# Patient Record
Sex: MALE | Race: WHITE | NOT HISPANIC OR LATINO | Employment: FULL TIME | URBAN - METROPOLITAN AREA
[De-identification: names, ages, dates, MRNs, and addresses within clinical notes are randomized per-mention and may not be internally consistent; named-entity substitution may affect disease eponyms.]

---

## 2017-01-16 ENCOUNTER — OFFICE VISIT (OUTPATIENT)
Dept: CARDIOLOGY | Facility: MEDICAL CENTER | Age: 52
End: 2017-01-16
Payer: COMMERCIAL

## 2017-01-16 ENCOUNTER — TELEPHONE (OUTPATIENT)
Dept: CARDIOLOGY | Facility: MEDICAL CENTER | Age: 52
End: 2017-01-16

## 2017-01-16 VITALS
SYSTOLIC BLOOD PRESSURE: 118 MMHG | BODY MASS INDEX: 35.47 KG/M2 | WEIGHT: 226 LBS | HEART RATE: 92 BPM | OXYGEN SATURATION: 93 % | DIASTOLIC BLOOD PRESSURE: 82 MMHG | HEIGHT: 67 IN

## 2017-01-16 DIAGNOSIS — E78.5 DYSLIPIDEMIA: ICD-10-CM

## 2017-01-16 DIAGNOSIS — R53.83 FATIGUE, UNSPECIFIED TYPE: ICD-10-CM

## 2017-01-16 DIAGNOSIS — R06.09 DOE (DYSPNEA ON EXERTION): ICD-10-CM

## 2017-01-16 DIAGNOSIS — R07.2 PRECORDIAL PAIN: ICD-10-CM

## 2017-01-16 PROCEDURE — 99244 OFF/OP CNSLTJ NEW/EST MOD 40: CPT | Performed by: INTERNAL MEDICINE

## 2017-01-16 RX ORDER — POTASSIUM CHLORIDE 20 MEQ/1
10 TABLET, EXTENDED RELEASE ORAL DAILY
Qty: 30 TAB | Refills: 11 | Status: SHIPPED | OUTPATIENT
Start: 2017-01-16 | End: 2017-12-05

## 2017-01-16 RX ORDER — FUROSEMIDE 20 MG/1
20 TABLET ORAL DAILY
Qty: 30 TAB | Refills: 11 | Status: SHIPPED | OUTPATIENT
Start: 2017-01-16 | End: 2017-12-05

## 2017-01-16 RX ORDER — FAMOTIDINE 40 MG/1
40 TABLET, FILM COATED ORAL DAILY
COMMUNITY
Start: 2017-01-09 | End: 2023-11-27

## 2017-01-16 ASSESSMENT — ENCOUNTER SYMPTOMS
FEVER: 0
ABDOMINAL PAIN: 0
HEADACHES: 0
DIZZINESS: 1
NERVOUS/ANXIOUS: 0
MYALGIAS: 0
DEPRESSION: 0
CHILLS: 0
DOUBLE VISION: 0
PALPITATIONS: 0
PND: 0
BLOOD IN STOOL: 0
LOSS OF CONSCIOUSNESS: 0
CLAUDICATION: 0
SHORTNESS OF BREATH: 1
BLURRED VISION: 0
COUGH: 0
ORTHOPNEA: 1

## 2017-01-16 NOTE — MR AVS SNAPSHOT
"        Sachin Mandujano   2017 2:00 PM   Office Visit   MRN: [id number]    Department:  [facility]   Dept Phone:  [phone]    Description:  Male : 1965   Provider:  Jaymie Aguiar M.D.           Allergies as of 2017     No Known Allergies      You were diagnosed with     RITCHIE (dyspnea on exertion)   [942327]       Fatigue, unspecified type   [7348402]       Precordial pain   [786.51.ICD-9-CM]       Dyslipidemia   [975830]         Vital Signs     Blood Pressure Pulse Height Weight Body Mass Index Oxygen Saturation    118/82 mmHg 92 1.702 m (5' 7.01\") 102.513 kg (226 lb) 35.39 kg/m2 93%    Smoking Status                   Never Smoker            Basic Information     Date Of Birth Sex Race Ethnicity Preferred Language    1965 Male White Non- English      Your appointments     2017  2:00 PM   NEW PATIENT with Jaymie Aguiar M.D.   [facility] (--)    [address]  [facility] [address] [phone]              Problem List              ICD-10-CM Priority Class Noted - Resolved    Umbilical hernia K42.9   2013 - Present      Health Maintenance     Patient has no pending health maintenance at this time      Current Immunizations     No immunizations on file.      Below and/or attached are the medications your provider expects you to take. Review all of your home medications and newly ordered medications with your provider and/or pharmacist. Follow medication instructions as directed by your provider and/or pharmacist. Please keep your medication list with you and share with your provider. Update the information when medications are discontinued, doses are changed, or new medications (including over-the-counter products) are added; and carry medication information at all times in the event of emergency situations     Allergies:  No Known Allergies          Medications  Valid as of: 2017 -  " 1:42 PM    Generic Name Brand Name Tablet Size Instructions for use    Allopurinol (Tab) ZYLOPRIM 300 MG Take 300 mg by mouth every day.          Famotidine (Tab) PEPCID 40 MG         Furosemide (Tab) LASIX 20 MG Take 1 Tab by mouth every day.        Oxycodone-Acetaminophen (Tab) PERCOCET 5-325 MG Take 1-2 Tabs by mouth every four hours as needed.        Oxycodone-Acetaminophen (Tab) PERCOCET 5-325 MG Take 1-2 Tabs by mouth every four hours as needed ((Pain scale 4 - 6)).        Potassium Chloride Shelby CR (Tab CR) K-DUR 20 MEQ Take 0.5 Tabs by mouth every day.        .                 Medicines prescribed today were sent to:     [id number] - [place], [place] - [address]    [address] [place] [address]    Phone: [phone] Fax: [phone]    Open 24 Hours?: No      Medication refill instructions:       If your prescription bottle indicates you have medication refills left, it is not necessary to call your provider’s office. Please contact your pharmacy and they will refill your medication.    If your prescription bottle indicates you do not have any refills left, you may request refills at any time through one of the following ways: The online [employer] system (except Urgent Care), by calling your provider’s office, or by asking your pharmacy to contact your provider’s office with a refill request. Medication refills are processed only during regular business hours and may not be available until the next business day. Your provider may request additional information or to have a follow-up visit with you prior to refilling your medication.   *Please Note: Medication refills are assigned a new Rx number when refilled electronically. Your pharmacy may indicate that no refills were authorized even though a new prescription for the same medication is available at the pharmacy. Please request the medicine by name with the pharmacy before contacting your provider for a refill.        Your To Do List      Future Labs/Procedures Complete By Expires    CT-CARDIAC SCORING  As directed 1/16/2018    Echocardiogram Comp w/o Cont  As directed 1/16/2018         MyChart Access Code: Activation code not generated  Current Idea Shower Status: Active

## 2017-01-16 NOTE — Clinical Note
[facility]   [address]  [place] [address]  Phone: [phone]  Fax: [phone]              Sachin Mandujano  1965    Encounter Date: 1/16/2017    Jaymie Aguiar M.D.          PROGRESS NOTE:  Subjective:   Sachin Mandujano is a 51 y.o. male no significant past medical history presenting today for evaluation of dyspnea, dizziness and chest pain    The past few months patient has noticed intermittent episodes of chest tightness worse with activity relieved with rest. Denied any associated complaints of nausea. Intermittent episodes of diaphoresis and dizziness associated with exertion. Also complaining of gait daytime fatigue and tiredness. Intermittent episodes of orthopnea but no PND. Denied any complaints of lower extremity edema or claudication.    Past Medical History   Diagnosis Date   • Arthritis    • Dental disorder      dentures   • Snoring      no sleep study     Past Surgical History   Procedure Laterality Date   • Laparoscopic umbilical hernia repair  1/24/2013     Performed by Junior Dodd M.D. at [facility]S   • Hernia repair       Family History   Problem Relation Age of Onset   • Diabetes     • Heart Disease     • Hypertension     • Hypertension Father      History   Smoking status   • Never Smoker    Smokeless tobacco   • Not on file     No Known Allergies  Outpatient Encounter Prescriptions as of 1/16/2017   Medication Sig Dispense Refill   • famotidine (PEPCID) 40 MG Tab      • furosemide (LASIX) 20 MG Tab Take 1 Tab by mouth every day. 30 Tab 11   • potassium chloride SA (K-DUR) 20 MEQ Tab CR Take 0.5 Tabs by mouth every day. 30 Tab 11   • allopurinol (ZYLOPRIM) 300 MG TABS Take 300 mg by mouth every day.       • oxycodone-acetaminophen (PERCOCET) 5-325 MG TABS Take 1-2 Tabs by mouth every four hours as needed. 15 Each 0   • oxycodone-acetaminophen (PERCOCET) 5-325 MG TABS Take 1-2 Tabs by mouth  "every four hours as needed ((Pain scale 4 - 6)). 40 Each 0     No facility-administered encounter medications on file as of 1/16/2017.     Review of Systems   Constitutional: Positive for malaise/fatigue. Negative for fever and chills.   HENT: Positive for tinnitus.    Eyes: Negative for blurred vision and double vision.   Respiratory: Positive for shortness of breath. Negative for cough.    Cardiovascular: Positive for chest pain and orthopnea. Negative for palpitations, claudication, leg swelling and PND.   Gastrointestinal: Negative for abdominal pain and blood in stool.   Genitourinary: Negative for dysuria and hematuria.   Musculoskeletal: Negative for myalgias and joint pain.   Skin: Negative for rash.   Neurological: Positive for dizziness (worse when patient gets up from sitting to standing position but no loss of consciousness). Negative for loss of consciousness and headaches.   Psychiatric/Behavioral: Negative for depression. The patient is not nervous/anxious.         Objective:   /82 mmHg  Pulse 92  Ht 1.702 m (5' 7.01\")  Wt 102.513 kg (226 lb)  BMI 35.39 kg/m2  SpO2 93%    Physical Exam   Constitutional: He is oriented to person, place, and time. He appears well-developed and well-nourished.   HENT:   Head: Normocephalic and atraumatic.   Eyes: Pupils are equal, round, and reactive to light. Right eye exhibits no discharge. Left eye exhibits no discharge.   Neck: JVD (10 cm) present. No tracheal deviation present.   Cardiovascular: Normal rate and regular rhythm.    No murmur heard.  Pulmonary/Chest: Effort normal and breath sounds normal. No respiratory distress. He has no wheezes. He has no rales.   Abdominal: Soft. Bowel sounds are normal. He exhibits no distension. There is no tenderness. There is no rebound.   Musculoskeletal: Normal range of motion. He exhibits no edema.   Neurological: He is alert and oriented to person, place, and time. No cranial nerve deficit.   Skin: Skin is warm " and dry. No erythema.   Psychiatric: He has a normal mood and affect.      Labs: 1/10/17  C-reactive protein cardiac 10.06  Sodium 141 potassium 4.6, chloride 102, bicarbonate 24, glucose 103, BUN 11, creatinine 1.18  Alkaline phosphate 69, AST 19, ALT 20  Total cholesterol 182, triglycerides 77, HDL 41,     Assessment:     1. RITCHIE (dyspnea on exertion)  furosemide (LASIX) 20 MG Tab    potassium chloride SA (K-DUR) 20 MEQ Tab CR    Echocardiogram Comp w/o Cont   2. Fatigue, unspecified type  OVERNIGHT PULSE OXIMETRY   3. Precordial pain  RIH Treadmill Stress   4. Dyslipidemia  CT-CARDIAC SCORING       Medical Decision Making:  Today's Assessment / Status / Plan:     1. Lasix 20 mg along with potassium chloride 10 mEq daily for one week  Echo to assess LV function.  2. Overnight pulse oximetry  3. Regular treadmill stress test.  Echo first to assess LV function if LV function is normal only then will schedule patient for regular treadmill stress test.  4. Cardiac CT scoring  If patient does have coronary pack will start him on statins.    Follow-up in 6 weeks.  Echo, regular treadmill stress test coronary CT prior to next visit.    Thank you for allowing me to participate in taking care of patient.    Jaymie Aguiar. MD.      Joe Rush M.D.  [address] [address]  VIA Facsimile: [phone]

## 2017-01-16 NOTE — PROGRESS NOTES
Subjective:   Sachin Mandujano is a 51 y.o. male no significant past medical history presenting today for evaluation of dyspnea, dizziness and chest pain    The past few months patient has noticed intermittent episodes of chest tightness worse with activity relieved with rest. Denied any associated complaints of nausea. Intermittent episodes of diaphoresis and dizziness associated with exertion. Also complaining of gait daytime fatigue and tiredness. Intermittent episodes of orthopnea but no PND. Denied any complaints of lower extremity edema or claudication.    Past Medical History   Diagnosis Date   • Arthritis    • Dental disorder      dentures   • Snoring      no sleep study     Past Surgical History   Procedure Laterality Date   • Laparoscopic umbilical hernia repair  1/24/2013     Performed by Junior Dodd M.D. at [facility] ORS   • Hernia repair       Family History   Problem Relation Age of Onset   • Diabetes     • Heart Disease     • Hypertension     • Hypertension Father      History   Smoking status   • Never Smoker    Smokeless tobacco   • Not on file     No Known Allergies  Outpatient Encounter Prescriptions as of 1/16/2017   Medication Sig Dispense Refill   • famotidine (PEPCID) 40 MG Tab      • furosemide (LASIX) 20 MG Tab Take 1 Tab by mouth every day. 30 Tab 11   • potassium chloride SA (K-DUR) 20 MEQ Tab CR Take 0.5 Tabs by mouth every day. 30 Tab 11   • allopurinol (ZYLOPRIM) 300 MG TABS Take 300 mg by mouth every day.       • oxycodone-acetaminophen (PERCOCET) 5-325 MG TABS Take 1-2 Tabs by mouth every four hours as needed. 15 Each 0   • oxycodone-acetaminophen (PERCOCET) 5-325 MG TABS Take 1-2 Tabs by mouth every four hours as needed ((Pain scale 4 - 6)). 40 Each 0     No facility-administered encounter medications on file as of 1/16/2017.     Review of Systems   Constitutional: Positive for malaise/fatigue. Negative for fever and chills.   HENT: Positive for tinnitus.    Eyes:  "Negative for blurred vision and double vision.   Respiratory: Positive for shortness of breath. Negative for cough.    Cardiovascular: Positive for chest pain and orthopnea. Negative for palpitations, claudication, leg swelling and PND.   Gastrointestinal: Negative for abdominal pain and blood in stool.   Genitourinary: Negative for dysuria and hematuria.   Musculoskeletal: Negative for myalgias and joint pain.   Skin: Negative for rash.   Neurological: Positive for dizziness (worse when patient gets up from sitting to standing position but no loss of consciousness). Negative for loss of consciousness and headaches.   Psychiatric/Behavioral: Negative for depression. The patient is not nervous/anxious.         Objective:   /82 mmHg  Pulse 92  Ht 1.702 m (5' 7.01\")  Wt 102.513 kg (226 lb)  BMI 35.39 kg/m2  SpO2 93%    Physical Exam   Constitutional: He is oriented to person, place, and time. He appears well-developed and well-nourished.   HENT:   Head: Normocephalic and atraumatic.   Eyes: Pupils are equal, round, and reactive to light. Right eye exhibits no discharge. Left eye exhibits no discharge.   Neck: JVD (10 cm) present. No tracheal deviation present.   Cardiovascular: Normal rate and regular rhythm.    No murmur heard.  Pulmonary/Chest: Effort normal and breath sounds normal. No respiratory distress. He has no wheezes. He has no rales.   Abdominal: Soft. Bowel sounds are normal. He exhibits no distension. There is no tenderness. There is no rebound.   Musculoskeletal: Normal range of motion. He exhibits no edema.   Neurological: He is alert and oriented to person, place, and time. No cranial nerve deficit.   Skin: Skin is warm and dry. No erythema.   Psychiatric: He has a normal mood and affect.      Labs: 1/10/17  C-reactive protein cardiac 10.06  Sodium 141 potassium 4.6, chloride 102, bicarbonate 24, glucose 103, BUN 11, creatinine 1.18  Alkaline phosphate 69, AST 19, ALT 20  Total cholesterol " 182, triglycerides 77, HDL 41,     Assessment:     1. RITCHIE (dyspnea on exertion)  furosemide (LASIX) 20 MG Tab    potassium chloride SA (K-DUR) 20 MEQ Tab CR    Echocardiogram Comp w/o Cont   2. Fatigue, unspecified type  OVERNIGHT PULSE OXIMETRY   3. Precordial pain  RIH Treadmill Stress   4. Dyslipidemia  CT-CARDIAC SCORING       Medical Decision Making:  Today's Assessment / Status / Plan:     1. Lasix 20 mg along with potassium chloride 10 mEq daily for one week  Echo to assess LV function.  2. Overnight pulse oximetry  3. Regular treadmill stress test.  Echo first to assess LV function if LV function is normal only then will schedule patient for regular treadmill stress test.  4. Cardiac CT scoring  If patient does have coronary pack will start him on statins.    Follow-up in 6 weeks.  Echo, regular treadmill stress test coronary CT prior to next visit.    Thank you for allowing me to participate in taking care of patient.    Jaymie Hearn MD.

## 2017-01-17 NOTE — TELEPHONE ENCOUNTER
PAR pending for potassium:  aSchin Mandujano Key: [id number] - PA Case ID: [id number] - Rx #: [id number]  Status   Sent to [employer]   DrugPotassium Chloride Shelby ER 20MEQ er tablets   FormCigna Commercial Electronic PA Form   Original Claim Info76 DAYS SUPPLY IS MORE THAN ALLOWED BY PLAN ; SUBMIT VALUE < OR = 90For help: [id number]

## 2017-01-18 ENCOUNTER — HOSPITAL ENCOUNTER (OUTPATIENT)
Dept: CARDIOLOGY | Facility: MEDICAL CENTER | Age: 52
End: 2017-01-18
Attending: INTERNAL MEDICINE
Payer: COMMERCIAL

## 2017-01-18 DIAGNOSIS — R06.09 DOE (DYSPNEA ON EXERTION): ICD-10-CM

## 2017-01-18 LAB
LV EJECT FRACT MOD 2C 99903: 59.41
LV EJECT FRACT MOD 4C 99902: 61.27
LV EJECT FRACT MOD BP 99901: 60.96

## 2017-01-18 PROCEDURE — 93306 TTE W/DOPPLER COMPLETE: CPT

## 2017-01-18 PROCEDURE — 93306 TTE W/DOPPLER COMPLETE: CPT | Mod: 26 | Performed by: INTERNAL MEDICINE

## 2017-01-19 ENCOUNTER — TELEPHONE (OUTPATIENT)
Dept: CARDIOLOGY | Facility: MEDICAL CENTER | Age: 52
End: 2017-01-19

## 2017-01-19 NOTE — PROGRESS NOTES
TTE: 1/17/17  No prior study is available for comparison.   Left ventricular ejection fraction is visually estimated to be 60%.   Normal regional wall motion. Grade I diastolic dysfunction.  No evidence of valvular abnormality based on Doppler evaluation.   Right ventricular systolic pressure is estimated to be 26 mmHg

## 2017-01-20 ENCOUNTER — TELEPHONE (OUTPATIENT)
Dept: CARDIOLOGY | Facility: MEDICAL CENTER | Age: 52
End: 2017-01-20

## 2017-01-20 NOTE — TELEPHONE ENCOUNTER
DONE    ----- Message from Jaymie Aguiar M.D. sent at 1/18/2017  4:19 PM PST -----  No prior study is available for comparison.   Left ventricular ejection fraction is visually estimated to be 60%.   No evidence of valvular abnormality based on Doppler evaluation.   Right ventricular systolic pressure is estimated to be 26 mmHg.       ----- Message -----     From: Samuel Devine     Sent: 1/18/2017   3:48 PM       To: Jaymie Aguiar M.D.

## 2017-06-21 ENCOUNTER — TELEPHONE (OUTPATIENT)
Dept: CARDIOLOGY | Facility: MEDICAL CENTER | Age: 52
End: 2017-06-21

## 2017-06-21 NOTE — TELEPHONE ENCOUNTER
PAR resubmitted:  Sachin Mandujano Ozuna: [id number]   Outcome   Additional Information Required   Available without authorization.   DrugPotassium Chloride Shelby ER 20MEQ OR TBCR   FormCigna Commercial Electronic PA Form

## 2017-10-23 DIAGNOSIS — Z01.812 PRE-OPERATIVE LABORATORY EXAMINATION: ICD-10-CM

## 2017-10-23 LAB
ANION GAP SERPL CALC-SCNC: 8 MMOL/L (ref 0–11.9)
BUN SERPL-MCNC: 11 MG/DL (ref 8–22)
CALCIUM SERPL-MCNC: 9.4 MG/DL (ref 8.5–10.5)
CHLORIDE SERPL-SCNC: 104 MMOL/L (ref 96–112)
CO2 SERPL-SCNC: 26 MMOL/L (ref 20–33)
CREAT SERPL-MCNC: 0.99 MG/DL (ref 0.5–1.4)
GFR SERPL CREATININE-BSD FRML MDRD: >60 ML/MIN/1.73 M 2
GLUCOSE SERPL-MCNC: 94 MG/DL (ref 65–99)
POTASSIUM SERPL-SCNC: 4 MMOL/L (ref 3.6–5.5)
SODIUM SERPL-SCNC: 138 MMOL/L (ref 135–145)

## 2017-10-23 PROCEDURE — 36415 COLL VENOUS BLD VENIPUNCTURE: CPT

## 2017-10-23 PROCEDURE — 80048 BASIC METABOLIC PNL TOTAL CA: CPT

## 2017-10-23 RX ORDER — LEVOTHYROXINE SODIUM 0.05 MG/1
50 TABLET ORAL
COMMUNITY
End: 2023-11-27 | Stop reason: SDUPTHER

## 2017-10-25 ENCOUNTER — HOSPITAL ENCOUNTER (OUTPATIENT)
Facility: MEDICAL CENTER | Age: 52
End: 2017-10-25
Attending: SURGERY | Admitting: SURGERY
Payer: COMMERCIAL

## 2017-10-25 VITALS
SYSTOLIC BLOOD PRESSURE: 139 MMHG | OXYGEN SATURATION: 93 % | TEMPERATURE: 97.2 F | WEIGHT: 216.71 LBS | RESPIRATION RATE: 16 BRPM | HEIGHT: 67 IN | BODY MASS INDEX: 34.01 KG/M2 | HEART RATE: 83 BPM | DIASTOLIC BLOOD PRESSURE: 84 MMHG

## 2017-10-25 PROBLEM — G56.01 CARPAL TUNNEL SYNDROME ON RIGHT: Status: ACTIVE | Noted: 2017-10-25

## 2017-10-25 PROCEDURE — 500881 HCHG PACK, EXTREMITY: Performed by: SURGERY

## 2017-10-25 PROCEDURE — 160021 HCHG LOCAL: Performed by: SURGERY

## 2017-10-25 PROCEDURE — 160028 HCHG SURGERY MINUTES - 1ST 30 MINS LEVEL 3: Performed by: SURGERY

## 2017-10-25 PROCEDURE — 160025 RECOVERY II MINUTES (STATS): Performed by: SURGERY

## 2017-10-25 PROCEDURE — 160046 HCHG PACU - 1ST 60 MINS PHASE II: Performed by: SURGERY

## 2017-10-25 PROCEDURE — 501838 HCHG SUTURE GENERAL: Performed by: SURGERY

## 2017-10-25 PROCEDURE — 160048 HCHG OR STATISTICAL LEVEL 1-5: Performed by: SURGERY

## 2017-10-25 PROCEDURE — 700111 HCHG RX REV CODE 636 W/ 250 OVERRIDE (IP)

## 2017-10-25 RX ORDER — LIDOCAINE HYDROCHLORIDE AND EPINEPHRINE 10; 10 MG/ML; UG/ML
INJECTION, SOLUTION INFILTRATION; PERINEURAL
Status: DISCONTINUED | OUTPATIENT
Start: 2017-10-25 | End: 2017-10-25 | Stop reason: HOSPADM

## 2017-10-25 RX ORDER — HYDROCODONE BITARTRATE AND ACETAMINOPHEN 5; 325 MG/1; MG/1
1-2 TABLET ORAL EVERY 4 HOURS PRN
Qty: 20 TAB | Refills: 0 | Status: SHIPPED | OUTPATIENT
Start: 2017-10-25 | End: 2017-12-05

## 2017-10-25 RX ORDER — IBUPROFEN 200 MG
600 TABLET ORAL
COMMUNITY

## 2017-10-25 RX ORDER — SODIUM CHLORIDE, SODIUM LACTATE, POTASSIUM CHLORIDE, CALCIUM CHLORIDE 600; 310; 30; 20 MG/100ML; MG/100ML; MG/100ML; MG/100ML
INJECTION, SOLUTION INTRAVENOUS CONTINUOUS
Status: DISCONTINUED | OUTPATIENT
Start: 2017-10-25 | End: 2017-10-25 | Stop reason: HOSPADM

## 2017-10-25 RX ADMIN — SODIUM CHLORIDE, SODIUM LACTATE, POTASSIUM CHLORIDE, CALCIUM CHLORIDE: 600; 310; 30; 20 INJECTION, SOLUTION INTRAVENOUS at 07:36

## 2017-10-25 ASSESSMENT — PAIN SCALES - GENERAL
PAINLEVEL_OUTOF10: 0
PAINLEVEL_OUTOF10: 4

## 2017-10-25 NOTE — DISCHARGE INSTRUCTIONS
ACTIVITY: Rest and take it easy for the first 24 hours.  A responsible adult is recommended to remain with you during that time.  It is normal to feel sleepy.  We encourage you to not do anything that requires balance, judgment or coordination.    MILD FLU-LIKE SYMPTOMS ARE NORMAL. YOU MAY EXPERIENCE GENERALIZED MUSCLE ACHES, THROAT IRRITATION, HEADACHE AND/OR SOME NAUSEA.    FOR 24 HOURS DO NOT:  Drive, operate machinery or run household appliances.  Drink beer or alcoholic beverages.   Make important decisions or sign legal documents.    SPECIAL INSTRUCTIONS:     1) Okay to remove bandage in 4 days and get incision wet and cover with bandaids.     2) Elevate above heart and ice frequently for at least 2 weeks. Encourage sedentary use of hand and frequent moving of fingers to prevent stiffness.     3) No heavy lifting or grasping for at least 4 weeks.     4) No driving while on narcotic pain medications. Contact auto-insurance carrier for clearance to begin driving again.     5) Call MD or come to ER for any major concerns.    DIET: To avoid nausea, slowly advance diet as tolerated, avoiding spicy or greasy foods for the first day.  Add more substantial food to your diet according to your physician's instructions.  Babies can be fed formula or breast milk as soon as they are hungry.  INCREASE FLUIDS AND FIBER TO AVOID CONSTIPATION.    SURGICAL DRESSING/BATHING: *see above*    FOLLOW-UP APPOINTMENT:  A follow-up appointment should be arranged with your doctor; call to schedule.    You should CALL YOUR PHYSICIAN if you develop:  Fever greater than 101 degrees F.  Pain not relieved by medication, or persistent nausea or vomiting.  Excessive bleeding (blood soaking through dressing) or unexpected drainage from the wound.  Extreme redness or swelling around the incision site, drainage of pus or foul smelling drainage.  Inability to urinate or empty your bladder within 8 hours.  Problems with breathing or chest  pain.    You should call 911 if you develop problems with breathing or chest pain.  If you are unable to contact your doctor or surgical center, you should go to the nearest emergency room or urgent care center.  Physician's telephone #: *Dr. Rodriguez [phone]*    If any questions arise, call your doctor.  If your doctor is not available, please feel free to call the Surgical Center at [phone].  The Center is open Monday through Friday from 7AM to 7PM.  You can also call the HEALTH HOTLINE open 24 hours/day, 7 days/week and speak to a nurse at [phone], or toll free at [phone].    A registered nurse may call you a few days after your surgery to see how you are doing after your procedure.    MEDICATIONS: Resume taking daily medication.  Take prescribed pain medication with food.  If no medication is prescribed, you may take non-aspirin pain medication if needed.  PAIN MEDICATION CAN BE VERY CONSTIPATING.  Take a stool softener or laxative such as senokot, pericolace, or milk of magnesia if needed.    Prescription given for *Norco*.      If your physician has prescribed pain medication that includes Acetaminophen (Tylenol), do not take additional Acetaminophen (Tylenol) while taking the prescribed medication.    Depression / Suicide Risk    As you are discharged from this [facility] facility, it is important to learn how to keep safe from harming yourself.    Recognize the warning signs:  · Abrupt changes in personality, positive or negative- including increase in energy   · Giving away possessions  · Change in eating patterns- significant weight changes-  positive or negative  · Change in sleeping patterns- unable to sleep or sleeping all the time   · Unwillingness or inability to communicate  · Depression  · Unusual sadness, discouragement and loneliness  · Talk of wanting to die  · Neglect of personal appearance   · Rebelliousness- reckless behavior  · Withdrawal from people/activities  they love  · Confusion- inability to concentrate     If you or a loved one observes any of these behaviors or has concerns about self-harm, here's what you can do:  · Talk about it- your feelings and reasons for harming yourself  · Remove any means that you might use to hurt yourself (examples: pills, rope, extension cords, firearm)  · Get professional help from the community (Mental Health, Substance Abuse, psychological counseling)  · Do not be alone:Call your Safe Contact- someone whom you trust who will be there for you.  · Call your local CRISIS HOTLINE 279-4513 or 058-749-0729  · Call your local Children's Mobile Crisis Response Team [place] (880) 598-9216 or www.[employer]  · Call the toll free National Suicide Prevention Hotlines   · National Suicide Prevention Lifeline 711-099-WDBH (2669)  · National Hope Line Network 800-SUICIDE (287-9591)

## 2017-10-25 NOTE — OR NURSING
0855 - pt verbalizes readiness for discharge. Instructions reviewed with pt and pt's wife. Pt wanted to walk out; gait steady, wife at side. No further needs.

## 2017-10-25 NOTE — OP REPORT
DATE OF SERVICE:  10/25/2017     SURGEON:  Cirilo Rodriguez MD.     ASSISTANT:  None.     PREOPERATIVE DIAGNOSIS:  Right carpal tunnel syndrome.     POSTOPERATIVE DIAGNOSIS:  Right carpal tunnel syndrome.     PROCEDURE:  Right open carpal tunnel release.     ANESTHESIOLOGIST:  Cesar Wise MD     ANESTHESIA:  Local anesthesia only (wide awake surgery technique using a total   of 20 mL of 1% lidocaine with epinephrine, no tourniquet).     COMPLICATIONS:  None noted.     DRAINS:  None.     SPECIMENS:  None.     ESTIMATED BLOOD LOSS:  Minimal.     TOURNIQUET TIME:  None.     INDICATIONS:  The patient is a 52-year-old gentleman well known to me through   my outpatient clinic for the above-mentioned diagnosis.  He believes and   agrees he has failed conservative treatment and he is a candidate for the   above-mentioned procedure.  Prior to the procedure, he understood the risks,   benefits, and alternatives to surgery.  He understood the risks to include,   but not limited to the risk of infection requiring repeat surgery, bleeding   requiring blood transfusion, nerve, blood vessel, tendon injury requiring   repair, chronic pain, failure to alleviate his symptoms or worsening of   symptoms requiring revision surgery, DVT, pulmonary embolism, heart attack,   stroke, and even death.  Patient states despite these risks, he wished to   proceed with surgery.     DESCRIPTION OF PROCEDURE:  The patient was met in the preoperative holding   area.  His right hand was initialed as correct operative site.  He had an   opportunity to ask questions, all questions were answered and informed consent   was obtained.  Under sterile conditions, I then performed a field block using   20 mL of 1% lidocaine with epinephrine over his carpal tunnel.  He was then   brought to the operating room and laid supine on the operating table.  All   bony and dependent prominences were well padded.  Ancef was administered for   infection  prophylaxis.  The right upper extremity was prepped and draped in   usual sterile fashion.  A formal time-out was performed, in which all parties   agreed upon the correct patient, procedure, and operative site.  I began the   procedure by verifying adequate anesthesia and then made approximately 4 cm   longitudinal incision in line with the ring finger over the carpal tunnel.  I   dissected down through the palmar aponeurosis, identified the transverse   carpal ligament which I released longitudinally in its entirety under direct   visualization as well as the distal forearm antebrachial fascia.  I then   copiously irrigated the wound with normal saline, closed the incision with   interrupted 4-0 nylon suture, covered with sterile Xeroform, 4x4s, and a   well-padded splint.  Patient was transferred in stable condition to PACU where   he had no immediate postoperative complaint.     POSTOPERATIVE PLAN:  The patient will be discharged home per same day   criteria, sedentary use of the upper extremity and follow up in approximately   2 weeks.

## 2017-10-25 NOTE — OR SURGEON
Immediate Post OP Note    PreOp Diagnosis: right CTS    PostOp Diagnosis: same    Procedure(s):  CARPAL TUNNEL RELEASE - Wound Class: Clean    Surgeon(s):  Cirilo Rodriguez M.D.    Anesthesiologist/Type of Anesthesia:  Anesthesiologist: Cesar Wise M.D./Local    Surgical Staff:  Circulator: Matilde Feliciano R.N.  Monitoring Nurse: Judy Beth R.N.  Relief Scrub: Javier Blake  Scrub Person: Pattie Randolph    Specimens:  * No specimens in log *    Estimated Blood Loss: minimal    Findings: see dictation    Complications: none noted.         10/25/2017 8:54 AM Cirilo Rodriguez

## 2017-12-05 ENCOUNTER — APPOINTMENT (OUTPATIENT)
Dept: ADMISSIONS | Facility: MEDICAL CENTER | Age: 52
End: 2017-12-05
Attending: SURGERY
Payer: COMMERCIAL

## 2017-12-13 ENCOUNTER — HOSPITAL ENCOUNTER (OUTPATIENT)
Facility: MEDICAL CENTER | Age: 52
End: 2017-12-13
Attending: SURGERY | Admitting: SURGERY
Payer: COMMERCIAL

## 2017-12-13 VITALS
BODY MASS INDEX: 35.33 KG/M2 | RESPIRATION RATE: 14 BRPM | HEART RATE: 79 BPM | OXYGEN SATURATION: 94 % | WEIGHT: 225.09 LBS | DIASTOLIC BLOOD PRESSURE: 94 MMHG | HEIGHT: 67 IN | SYSTOLIC BLOOD PRESSURE: 135 MMHG | TEMPERATURE: 98.7 F

## 2017-12-13 PROCEDURE — 500881 HCHG PACK, EXTREMITY: Performed by: SURGERY

## 2017-12-13 PROCEDURE — 160025 RECOVERY II MINUTES (STATS): Performed by: SURGERY

## 2017-12-13 PROCEDURE — 700101 HCHG RX REV CODE 250

## 2017-12-13 PROCEDURE — 700111 HCHG RX REV CODE 636 W/ 250 OVERRIDE (IP)

## 2017-12-13 PROCEDURE — 160028 HCHG SURGERY MINUTES - 1ST 30 MINS LEVEL 3: Performed by: SURGERY

## 2017-12-13 PROCEDURE — 160021 HCHG LOCAL: Performed by: SURGERY

## 2017-12-13 PROCEDURE — 160046 HCHG PACU - 1ST 60 MINS PHASE II: Performed by: SURGERY

## 2017-12-13 PROCEDURE — 501838 HCHG SUTURE GENERAL: Performed by: SURGERY

## 2017-12-13 PROCEDURE — 160048 HCHG OR STATISTICAL LEVEL 1-5: Performed by: SURGERY

## 2017-12-13 RX ORDER — LIDOCAINE HYDROCHLORIDE 10 MG/ML
INJECTION, SOLUTION INFILTRATION; PERINEURAL
Status: COMPLETED
Start: 2017-12-13 | End: 2017-12-13

## 2017-12-13 RX ORDER — LIDOCAINE HYDROCHLORIDE 10 MG/ML
0.5 INJECTION, SOLUTION INFILTRATION; PERINEURAL
Status: DISCONTINUED | OUTPATIENT
Start: 2017-12-13 | End: 2017-12-13 | Stop reason: HOSPADM

## 2017-12-13 RX ORDER — LIDOCAINE AND PRILOCAINE 25; 25 MG/G; MG/G
1 CREAM TOPICAL
Status: DISCONTINUED | OUTPATIENT
Start: 2017-12-13 | End: 2017-12-13 | Stop reason: HOSPADM

## 2017-12-13 RX ORDER — LIDOCAINE HYDROCHLORIDE AND EPINEPHRINE 10; 10 MG/ML; UG/ML
INJECTION, SOLUTION INFILTRATION; PERINEURAL
Status: DISCONTINUED | OUTPATIENT
Start: 2017-12-13 | End: 2017-12-13 | Stop reason: HOSPADM

## 2017-12-13 RX ORDER — SODIUM CHLORIDE, SODIUM LACTATE, POTASSIUM CHLORIDE, CALCIUM CHLORIDE 600; 310; 30; 20 MG/100ML; MG/100ML; MG/100ML; MG/100ML
INJECTION, SOLUTION INTRAVENOUS CONTINUOUS
Status: DISCONTINUED | OUTPATIENT
Start: 2017-12-13 | End: 2017-12-13 | Stop reason: HOSPADM

## 2017-12-13 RX ORDER — COVID-19 ANTIGEN TEST
1 KIT MISCELLANEOUS 2 TIMES DAILY PRN
COMMUNITY
End: 2023-11-27

## 2017-12-13 RX ADMIN — SODIUM CHLORIDE, SODIUM LACTATE, POTASSIUM CHLORIDE, CALCIUM CHLORIDE: 600; 310; 30; 20 INJECTION, SOLUTION INTRAVENOUS at 06:10

## 2017-12-13 RX ADMIN — LIDOCAINE HYDROCHLORIDE 0.5 ML: 10 INJECTION, SOLUTION INFILTRATION; PERINEURAL at 06:10

## 2017-12-13 ASSESSMENT — PAIN SCALES - GENERAL
PAINLEVEL_OUTOF10: 0

## 2017-12-13 NOTE — DISCHARGE INSTRUCTIONS
ACTIVITY: Rest and take it easy for the first 24 hours.  A responsible adult is recommended to remain with you during that time.  It is normal to feel sleepy.  We encourage you to not do anything that requires balance, judgment or coordination.    MILD FLU-LIKE SYMPTOMS ARE NORMAL. YOU MAY EXPERIENCE GENERALIZED MUSCLE ACHES, THROAT IRRITATION, HEADACHE AND/OR SOME NAUSEA.    FOR 24 HOURS DO NOT:  Drive, operate machinery or run household appliances.  Drink beer or alcoholic beverages.   Make important decisions or sign legal documents.        SPECIAL INSTRUCTIONS:   Okay to remove bandage in 4 days and get incision wet and cover with bandaids.     Elevate above heart and ice frequently for at least 2 weeks. Encourage sedentary use of hand and frequent moving of fingers to prevent stiffness.     No heavy lifting or grasping for at least 4 weeks.     No driving while on narcotic pain medications. Contact auto-insurance carrier for clearance to begin driving again.     Call MD or come to ER for any major concerns.      Carpal Tunnel Release, Care After  Refer to this sheet in the next few weeks. These instructions provide you with information about caring for yourself after your procedure. Your health care provider may also give you more specific instructions. Your treatment has been planned according to current medical practices, but problems sometimes occur. Call your health care provider if you have any problems or questions after your procedure.  WHAT TO EXPECT AFTER THE PROCEDURE  After your procedure, it is typical to have the following:  · Pain.  · Numbness.  · Tingling.  · Swelling.  · Stiffness.  · Bruising.  HOME CARE INSTRUCTIONS  · Take medicines only as directed by your health care provider.  · There are many different ways to close and cover an incision, including stitches (sutures), skin glue, and adhesive strips. Follow your health care provider's instructions about:  ¨ Incision care.  ¨ Bandage  (dressing) changes and removal.  ¨ Incision closure removal.  · Wear a splint or a brace as directed by your surgeon. You may need to do this for 2-3 weeks.  · Keep your hand raised (elevated) above the level of your heart while you are resting. Move your fingers often.  · Avoid activities that cause hand pain.  · Ask your surgeon when you can start to do all of your usual activities again, such as:  ¨ Driving.  ¨ Returning to work.  ¨ Bathing and swimming.  · Keep all follow-up visits as directed by your health care provider. This is important. You may need physical therapy for several months to speed healing and regain movement.  SEEK MEDICAL CARE IF:  · You have drainage, redness, swelling, or pain at your incision site.  · You have a fever.  · You have chills.  · Your pain medicine is not working.  · Your symptoms do not go away after 2 months.  · Your symptoms go away and then return.  SEEK IMMEDIATE MEDICAL CARE IF:  · You have pain or numbness that is getting worse.  · Your fingers change color.  · You are not able to move your fingers.     This information is not intended to replace advice given to you by your health care provider. Make sure you discuss any questions you have with your health care provider.     Document Released: 07/07/2006 Document Revised: 01/08/2016 Document Reviewed: 08/05/2015  [email or web address] Interactive Patient Education ©2016 [email or web address]      DIET: To avoid nausea, slowly advance diet as tolerated, avoiding spicy or greasy foods for the first day.  Add more substantial food to your diet according to your physician's instructions.  Babies can be fed formula or breast milk as soon as they are hungry.  INCREASE FLUIDS AND FIBER TO AVOID CONSTIPATION.        FOLLOW-UP APPOINTMENT:  A follow-up appointment should be arranged with your doctor. Call to schedule.      You should call 911 if you develop problems with breathing or chest pain.  If you are unable to contact your doctor or surgical  center, you should go to the nearest emergency room or urgent care center.  Physician's telephone #: *[phone]**    If any questions arise, call your doctor.  If your doctor is not available, please feel free to call the Surgical Center at [phone].  The Center is open Monday through Friday from 7AM to 7PM.  You can also call the HEALTH HOTLINE open 24 hours/day, 7 days/week and speak to a nurse at [phone], or toll free at [phone].    A registered nurse may call you a few days after your surgery to see how you are doing after your procedure.    MEDICATIONS: Resume taking daily medication.  Take prescribed pain medication with food.  If no medication is prescribed, you may take non-aspirin pain medication if needed.  PAIN MEDICATION CAN BE VERY CONSTIPATING.  Take a stool softener or laxative such as senokot, pericolace, or milk of magnesia if needed.      If your physician has prescribed pain medication that includes Acetaminophen (Tylenol), do not take additional Acetaminophen (Tylenol) while taking the prescribed medication.    Depression / Suicide Risk    As you are discharged from this [facility] facility, it is important to learn how to keep safe from harming yourself.    Recognize the warning signs:  · Abrupt changes in personality, positive or negative- including increase in energy   · Giving away possessions  · Change in eating patterns- significant weight changes-  positive or negative  · Change in sleeping patterns- unable to sleep or sleeping all the time   · Unwillingness or inability to communicate  · Depression  · Unusual sadness, discouragement and loneliness  · Talk of wanting to die  · Neglect of personal appearance   · Rebelliousness- reckless behavior  · Withdrawal from people/activities they love  · Confusion- inability to concentrate     If you or a loved one observes any of these behaviors or has concerns about self-harm, here's what you can do:  · Talk about it- your  feelings and reasons for harming yourself  · Remove any means that you might use to hurt yourself (examples: pills, rope, extension cords, firearm)  · Get professional help from the community (Mental Health, Substance Abuse, psychological counseling)  · Do not be alone:Call your Safe Contact- someone whom you trust who will be there for you.  · Call your local CRISIS HOTLINE 605-7797 or 779-804-2037  · Call your local Children's Mobile Crisis Response Team [place] (609) 961-4979 or www.HALGI  · Call the toll free National Suicide Prevention Hotlines   · National Suicide Prevention Lifeline 123-048-ZVEL (3389)  · National Hope Line Network 800-SUICIDE (104-3465)

## 2017-12-13 NOTE — OP REPORT
DATE OF SERVICE:  12/13/2017    SURGEON:  Cirilo Rodriguez MD    ASSISTANT:  James Bacon PA-C    PREOPERATIVE DIAGNOSES:  1.  Left carpal tunnel syndrome.  2.  Left ring finger PIP joint ganglion.    POSTOPERATIVE DIAGNOSES:  1.  Left carpal tunnel syndrome.  2.  Left ring finger PIP joint ganglion.    PROCEDURES:  1.  Left open carpal tunnel release.  2.  Left ring finger PIP joint mass excision (less than 1.5 cm ganglion).    ANESTHESIOLOGIST:  Jyotsna Birch MD    ANESTHESIA:  Local anesthesia only (wide awake surgery technique using a total   of approximately 30 mL of 1% lidocaine with epinephrine, no tourniquet).    COMPLICATIONS:  None noted.    DRAINS:  None.    SPECIMENS:  None.    ESTIMATED BLOOD LOSS:  Minimal.    INDICATIONS:  The patient is a 52-year-old gentleman well known to me through   my outpatient clinic for the above-mentioned diagnoses.  He believes and   agrees he has failed conservative treatment and is a candidate for the   above-mentioned procedures.  Prior to the procedure, he understood the risks,   benefits, and alternatives to surgery.  He understood the risks to include,   but not limited to the risk of infection requiring repeat surgery, bleeding   requiring blood transfusion, nerve, blood vessel, and tendon injury requiring   repair, chronic pain, failure to alleviate his symptoms or worsening of   symptoms, recurrence of the mass requiring revision surgery, DVT, pulmonary   embolism, heart attack, stroke, and even death.  The patient states despite   these risks, he wished to proceed with surgery.    DESCRIPTION OF PROCEDURE:  Under sterile conditions, I performed a field block   using a total of approximately 20 mL of 1% lidocaine with epinephrine over   the patient's carpal tunnel as a field block and then a digital block at the   base of the ring finger with approximately 10 mL of 1% lidocaine with   epinephrine.  The patient tolerated the procedure well.  He was  brought to the   operating room and laid supine on the operating room table.  All bony and   dependent prominences were well padded.  Ancef was administered for infection   prophylaxis.  The left upper extremity was prepped and draped in the usual   sterile fashion.  A formal time-out was performed, in which all parties agreed   upon the correct patient, procedure, and operative site.  I began the   procedure by verifying adequate anesthesia.  I then made approximately 4 cm   longitudinal incision in line with the ring finger over the carpal tunnel and   dissected down through the palmar aponeurosis.  I identified the transverse   carpal ligament, which I released longitudinally in its entirety under direct   visualization as well as the distal forearm antebrachial fascia.  I then   turned my attention to the ring finger.  I made a curvilinear mid lateral   incision over the palpable mass at the ulnar side of the DIP joint, dissected   down subcutaneous tissues, identifying and protecting neurovascular bundles,   identified the mass which was fluid filled, classic appearance of a ganglion,   which I excised with a scalpel down to the level of the capsule.  I excised a   small cuff of capsule to prevent recurrence and cauterized this area.  I then   copiously irrigated both wounds with normal saline, closed the incisions with   interrupted 4-0 nylon suture, covered with sterile Xeroform, 4x4s, and   well-padded splint.  The patient awoke from anesthesia without any   complication and was transferred in stable condition to PACU where he had no   immediate post-term complaints.    POSTOPERATIVE PLAN:  The patient will be discharged home per same day   criteria, sedentary use of the upper extremity, and follow up in clinic in   10-14 days for suture removal and wound check.       ____________________________________     MD KATHERINE Foley / BRITTANI    DD:  12/13/2017 08:20:55  DT:  12/13/2017 08:38:07    D#:   [id number]  Job#:  [id number]

## 2023-05-09 ENCOUNTER — APPOINTMENT (RX ONLY)
Dept: URBAN - METROPOLITAN AREA CLINIC 6 | Facility: CLINIC | Age: 58
Setting detail: DERMATOLOGY
End: 2023-05-09

## 2023-05-09 DIAGNOSIS — D485 NEOPLASM OF UNCERTAIN BEHAVIOR OF SKIN: ICD-10-CM

## 2023-05-09 DIAGNOSIS — L57.0 ACTINIC KERATOSIS: ICD-10-CM

## 2023-05-09 DIAGNOSIS — Z71.89 OTHER SPECIFIED COUNSELING: ICD-10-CM

## 2023-05-09 PROBLEM — D48.5 NEOPLASM OF UNCERTAIN BEHAVIOR OF SKIN: Status: ACTIVE | Noted: 2023-05-09

## 2023-05-09 PROCEDURE — 99202 OFFICE O/P NEW SF 15 MIN: CPT | Mod: 25

## 2023-05-09 PROCEDURE — ? LIQUID NITROGEN

## 2023-05-09 PROCEDURE — 17003 DESTRUCT PREMALG LES 2-14: CPT

## 2023-05-09 PROCEDURE — 11102 TANGNTL BX SKIN SINGLE LES: CPT

## 2023-05-09 PROCEDURE — 17000 DESTRUCT PREMALG LESION: CPT | Mod: 59

## 2023-05-09 PROCEDURE — ? COUNSELING

## 2023-05-09 PROCEDURE — ? BIOPSY BY SHAVE METHOD

## 2023-05-09 ASSESSMENT — LOCATION ZONE DERM
LOCATION ZONE: TRUNK
LOCATION ZONE: EAR
LOCATION ZONE: NOSE

## 2023-05-09 ASSESSMENT — LOCATION DETAILED DESCRIPTION DERM
LOCATION DETAILED: NASAL DORSUM
LOCATION DETAILED: LEFT SUPERIOR HELIX
LOCATION DETAILED: SUPERIOR THORACIC SPINE
LOCATION DETAILED: RIGHT INFERIOR HELIX
LOCATION DETAILED: LEFT INFERIOR HELIX
LOCATION DETAILED: RIGHT SUPERIOR HELIX

## 2023-05-09 ASSESSMENT — LOCATION SIMPLE DESCRIPTION DERM
LOCATION SIMPLE: NOSE
LOCATION SIMPLE: RIGHT EAR
LOCATION SIMPLE: UPPER BACK
LOCATION SIMPLE: LEFT EAR

## 2023-05-09 NOTE — PROCEDURE: LIQUID NITROGEN
Application Tool (Optional): Liquid Nitrogen Sprayer
Render Note In Bullet Format When Appropriate: No
Show Aperture Variable?: Yes
Consent: The patient's consent was obtained including but not limited to risks of crusting, scabbing, blistering, scarring, darker or lighter pigmentary change, recurrence, incomplete removal and infection.
Detail Level: Detailed
Duration Of Freeze Thaw-Cycle (Seconds): 5
Post-Care Instructions: I reviewed with the patient in detail post-care instructions. Patient is to wear sunprotection, and avoid picking at any of the treated lesions. Pt may apply Vaseline to crusted or scabbing areas.
Number Of Freeze-Thaw Cycles: 3 freeze-thaw cycles

## 2023-05-09 NOTE — PROCEDURE: BIOPSY BY SHAVE METHOD
Detail Level: Detailed
Depth Of Biopsy: dermis
Was A Bandage Applied: Yes
Size Of Lesion In Cm: 0.6
X Size Of Lesion In Cm: 0
Biopsy Type: H and E
Biopsy Method: Dermablade
Anesthesia Type: 0.5% lidocaine without epinephrine
Anesthesia Volume In Cc: 0.5
Hemostasis: Drysol
Wound Care: Petrolatum
Dressing: bandage
Destruction After The Procedure: No
Type Of Destruction Used: Curettage
Curettage Text: The wound bed was treated with curettage after the biopsy was performed.
Cryotherapy Text: The wound bed was treated with cryotherapy after the biopsy was performed.
Electrodesiccation Text: The wound bed was treated with electrodesiccation after the biopsy was performed.
Electrodesiccation And Curettage Text: The wound bed was treated with electrodesiccation and curettage after the biopsy was performed.
Silver Nitrate Text: The wound bed was treated with silver nitrate after the biopsy was performed.
Lab: [id number]
Lab Facility: 
Consent: Written consent was obtained and risks were reviewed including but not limited to scarring, infection, bleeding, scabbing, incomplete removal, nerve damage and allergy to anesthesia.
Post-Care Instructions: I reviewed with the patient in detail post-care instructions. Patient is to keep the biopsy site dry overnight, and then apply bacitracin twice daily until healed. Patient may apply hydrogen peroxide soaks to remove any crusting.
Notification Instructions: Patient will be notified of biopsy results. However, patient instructed to call the office if not contacted within 2 weeks.
Billing Type: Third-Party Bill
Information: Selecting Yes will display possible errors in your note based on the variables you have selected. This validation is only offered as a suggestion for you. PLEASE NOTE THAT THE VALIDATION TEXT WILL BE REMOVED WHEN YOU FINALIZE YOUR NOTE. IF YOU WANT TO FAX A PRELIMINARY NOTE YOU WILL NEED TO TOGGLE THIS TO 'NO' IF YOU DO NOT WANT IT IN YOUR FAXED NOTE.

## 2023-06-27 ENCOUNTER — APPOINTMENT (RX ONLY)
Dept: URBAN - METROPOLITAN AREA CLINIC 6 | Facility: CLINIC | Age: 58
Setting detail: DERMATOLOGY
End: 2023-06-27

## 2023-06-27 DIAGNOSIS — L57.0 ACTINIC KERATOSIS: ICD-10-CM

## 2023-06-27 DIAGNOSIS — L30.9 DERMATITIS, UNSPECIFIED: ICD-10-CM

## 2023-06-27 PROCEDURE — ? ADDITIONAL NOTES

## 2023-06-27 PROCEDURE — ? PRESCRIPTION

## 2023-06-27 PROCEDURE — ? COUNSELING

## 2023-06-27 PROCEDURE — 17000 DESTRUCT PREMALG LESION: CPT

## 2023-06-27 PROCEDURE — 17003 DESTRUCT PREMALG LES 2-14: CPT

## 2023-06-27 PROCEDURE — ? LIQUID NITROGEN

## 2023-06-27 PROCEDURE — ? PHOTODYNAMIC THERAPY COUNSELING

## 2023-06-27 PROCEDURE — 99214 OFFICE O/P EST MOD 30 MIN: CPT | Mod: 25

## 2023-06-27 RX ORDER — HYDROCORTISONE 25 MG/G
CREAM TOPICAL BID
Qty: 20 | Refills: 0 | Status: ERX | COMMUNITY
Start: 2023-06-27

## 2023-06-27 RX ADMIN — HYDROCORTISONE: 25 CREAM TOPICAL at 00:00

## 2023-06-27 ASSESSMENT — LOCATION DETAILED DESCRIPTION DERM
LOCATION DETAILED: RIGHT LATERAL MALAR CHEEK
LOCATION DETAILED: NASAL DORSUM

## 2023-06-27 ASSESSMENT — LOCATION SIMPLE DESCRIPTION DERM
LOCATION SIMPLE: RIGHT CHEEK
LOCATION SIMPLE: NOSE

## 2023-06-27 ASSESSMENT — LOCATION ZONE DERM
LOCATION ZONE: FACE
LOCATION ZONE: NOSE

## 2023-06-27 NOTE — PROCEDURE: LIQUID NITROGEN
Post-Care Instructions: I reviewed with the patient in detail post-care instructions. Patient is to wear sunprotection, and avoid picking at any of the treated lesions. Pt may apply Vaseline to crusted or scabbing areas.
Duration Of Freeze Thaw-Cycle (Seconds): 3
Show Applicator Variable?: Yes
Number Of Freeze-Thaw Cycles: 3 freeze-thaw cycles
Render Post-Care Instructions In Note?: no
Detail Level: Detailed
Consent: The patient's consent was obtained including but not limited to risks of crusting, scabbing, blistering, scarring, darker or lighter pigmentary change, recurrence, incomplete removal and infection.

## 2023-06-27 NOTE — PROCEDURE: ADDITIONAL NOTES
Additional Notes: Follow up post biopsy
Render Risk Assessment In Note?: no
Detail Level: Simple
Additional Notes: Biopsy if doesn’t improve

## 2023-07-11 ENCOUNTER — APPOINTMENT (RX ONLY)
Dept: URBAN - METROPOLITAN AREA CLINIC 6 | Facility: CLINIC | Age: 58
Setting detail: DERMATOLOGY
End: 2023-07-11

## 2023-07-11 DIAGNOSIS — L57.0 ACTINIC KERATOSIS: ICD-10-CM

## 2023-07-11 DIAGNOSIS — Z71.89 OTHER SPECIFIED COUNSELING: ICD-10-CM

## 2023-07-11 PROCEDURE — ? COUNSELING

## 2023-07-11 PROCEDURE — ? LIQUID NITROGEN

## 2023-07-11 PROCEDURE — 17000 DESTRUCT PREMALG LESION: CPT

## 2023-07-11 PROCEDURE — 99212 OFFICE O/P EST SF 10 MIN: CPT | Mod: 25

## 2023-07-11 PROCEDURE — ? DIAGNOSIS COMMENT

## 2023-07-11 ASSESSMENT — LOCATION DETAILED DESCRIPTION DERM
LOCATION DETAILED: INFERIOR THORACIC SPINE
LOCATION DETAILED: LEFT MEDIAL SUPERIOR TARSAL REGION

## 2023-07-11 ASSESSMENT — LOCATION ZONE DERM
LOCATION ZONE: EYELID
LOCATION ZONE: TRUNK

## 2023-07-11 ASSESSMENT — LOCATION SIMPLE DESCRIPTION DERM
LOCATION SIMPLE: UPPER BACK
LOCATION SIMPLE: LEFT MEDIAL SUPERIOR TARSAL REGION

## 2023-07-11 NOTE — PROCEDURE: LIQUID NITROGEN
Application Tool (Optional): Liquid Nitrogen Sprayer
Render Note In Bullet Format When Appropriate: No
Show Applicator Variable?: Yes
Consent: The patient's consent was obtained including but not limited to risks of crusting, scabbing, blistering, scarring, darker or lighter pigmentary change, recurrence, incomplete removal and infection.
Detail Level: Detailed
Number Of Freeze-Thaw Cycles: 3 freeze-thaw cycles
Duration Of Freeze Thaw-Cycle (Seconds): 5
Post-Care Instructions: I reviewed with the patient in detail post-care instructions. Patient is to wear sunprotection, and avoid picking at any of the treated lesions. Pt may apply Vaseline to crusted or scabbing areas.

## 2023-08-15 ENCOUNTER — APPOINTMENT (RX ONLY)
Dept: URBAN - METROPOLITAN AREA CLINIC 6 | Facility: CLINIC | Age: 58
Setting detail: DERMATOLOGY
End: 2023-08-15

## 2023-08-15 DIAGNOSIS — L57.0 ACTINIC KERATOSIS: ICD-10-CM | Status: IMPROVED

## 2023-08-15 DIAGNOSIS — Z71.89 OTHER SPECIFIED COUNSELING: ICD-10-CM

## 2023-08-15 PROCEDURE — ? LIQUID NITROGEN

## 2023-08-15 PROCEDURE — ? COUNSELING

## 2023-08-15 PROCEDURE — 17000 DESTRUCT PREMALG LESION: CPT

## 2023-08-15 PROCEDURE — 99212 OFFICE O/P EST SF 10 MIN: CPT | Mod: 25

## 2023-08-15 ASSESSMENT — LOCATION DETAILED DESCRIPTION DERM
LOCATION DETAILED: NASAL DORSUM
LOCATION DETAILED: INFERIOR THORACIC SPINE

## 2023-08-15 ASSESSMENT — LOCATION ZONE DERM
LOCATION ZONE: TRUNK
LOCATION ZONE: NOSE

## 2023-08-15 ASSESSMENT — LOCATION SIMPLE DESCRIPTION DERM
LOCATION SIMPLE: UPPER BACK
LOCATION SIMPLE: NOSE

## 2023-08-15 NOTE — PROCEDURE: LIQUID NITROGEN
Consent: The patient's consent was obtained including but not limited to risks of crusting, scabbing, blistering, scarring, darker or lighter pigmentary change, recurrence, incomplete removal and infection.
Post-Care Instructions: I reviewed with the patient in detail post-care instructions. Patient is to wear sunprotection, and avoid picking at any of the treated lesions. Pt may apply Vaseline to crusted or scabbing areas.
Show Aperture Variable?: Yes
Duration Of Freeze Thaw-Cycle (Seconds): 5
Render Post-Care Instructions In Note?: no
Number Of Freeze-Thaw Cycles: 3 freeze-thaw cycles
Detail Level: Detailed

## 2023-11-27 ENCOUNTER — OFFICE VISIT (OUTPATIENT)
Dept: MEDICAL GROUP | Facility: PHYSICIAN GROUP | Age: 58
End: 2023-11-27
Payer: COMMERCIAL

## 2023-11-27 VITALS
BODY MASS INDEX: 33.28 KG/M2 | RESPIRATION RATE: 16 BRPM | HEIGHT: 67 IN | DIASTOLIC BLOOD PRESSURE: 80 MMHG | TEMPERATURE: 98.4 F | SYSTOLIC BLOOD PRESSURE: 124 MMHG | WEIGHT: 212.06 LBS | OXYGEN SATURATION: 96 % | HEART RATE: 86 BPM

## 2023-11-27 DIAGNOSIS — E66.3 OVERWEIGHT: ICD-10-CM

## 2023-11-27 DIAGNOSIS — E55.9 VITAMIN D DEFICIENCY: ICD-10-CM

## 2023-11-27 DIAGNOSIS — R22.32 MASS OF LEFT ELBOW: ICD-10-CM

## 2023-11-27 DIAGNOSIS — K21.9 GASTROESOPHAGEAL REFLUX DISEASE WITHOUT ESOPHAGITIS: ICD-10-CM

## 2023-11-27 DIAGNOSIS — R22.42 KNEE MASS, LEFT: ICD-10-CM

## 2023-11-27 DIAGNOSIS — M10.9 GOUT, UNSPECIFIED CAUSE, UNSPECIFIED CHRONICITY, UNSPECIFIED SITE: ICD-10-CM

## 2023-11-27 DIAGNOSIS — Z11.3 SCREENING EXAMINATION FOR SEXUALLY TRANSMITTED DISEASE: ICD-10-CM

## 2023-11-27 DIAGNOSIS — Z12.5 ENCOUNTER FOR SCREENING FOR MALIGNANT NEOPLASM OF PROSTATE: ICD-10-CM

## 2023-11-27 DIAGNOSIS — Z12.11 COLON CANCER SCREENING: ICD-10-CM

## 2023-11-27 DIAGNOSIS — Z11.59 NEED FOR HEPATITIS C SCREENING TEST: ICD-10-CM

## 2023-11-27 DIAGNOSIS — E03.8 OTHER SPECIFIED HYPOTHYROIDISM: ICD-10-CM

## 2023-11-27 PROBLEM — G56.01 CARPAL TUNNEL SYNDROME ON RIGHT: Status: RESOLVED | Noted: 2017-10-25 | Resolved: 2023-11-27

## 2023-11-27 PROCEDURE — 3074F SYST BP LT 130 MM HG: CPT | Performed by: PHYSICIAN ASSISTANT

## 2023-11-27 PROCEDURE — 99204 OFFICE O/P NEW MOD 45 MIN: CPT | Performed by: PHYSICIAN ASSISTANT

## 2023-11-27 PROCEDURE — 3079F DIAST BP 80-89 MM HG: CPT | Performed by: PHYSICIAN ASSISTANT

## 2023-11-27 RX ORDER — LEVOTHYROXINE SODIUM 0.05 MG/1
50 TABLET ORAL
Qty: 90 TABLET | Refills: 0 | Status: SHIPPED | OUTPATIENT
Start: 2023-11-27

## 2023-11-27 RX ORDER — ALLOPURINOL 300 MG/1
300 TABLET ORAL DAILY
Qty: 90 TABLET | Refills: 0 | Status: SHIPPED | OUTPATIENT
Start: 2023-11-27

## 2023-11-27 RX ORDER — SODIUM PHOSPHATE,MONO-DIBASIC 19G-7G/118
1500 ENEMA (ML) RECTAL
COMMUNITY

## 2023-11-27 ASSESSMENT — ENCOUNTER SYMPTOMS
SHORTNESS OF BREATH: 0
FEVER: 0
CHILLS: 0

## 2023-11-27 ASSESSMENT — PATIENT HEALTH QUESTIONNAIRE - PHQ9: CLINICAL INTERPRETATION OF PHQ2 SCORE: 0

## 2023-11-27 NOTE — PROGRESS NOTES
"Subjective:     CC: establish care; prior outside of [facility]    HPI:   Sachin presents today with the following:    Problem   Gastroesophageal Reflux Disease Without Esophagitis    Chronic, stable.  Has been on Nexium 20 mg for few years.  He started it due to heartburn.  Advised that he could stop taking it and reevaluate the need.  If heartburn comes back, discussed with GI when he sees them for his colonoscopy.     Gout    Chronic, controlled.  Tolerating allopurinol 300mg daily.     Other Specified Hypothyroidism    Chronic, controlled.  Tolerating levothyroxine 50mcg daily.     Mass of Left Elbow    Chronic, uncontrolled.  Started around 2016 or earlier.  MRI 2016 does show a mass but nonspecific.  Was seeing FREDERIC at the time.  Referring back to Ortho     Knee Mass, Left    Chronic, uncontrolled.  Started around 2020.  Denies pain.  Referring to Ortho.     Carpal Tunnel Syndrome On Right (Resolved)   Umbilical Hernia (Resolved)    ICD-10 transition           ROS:  Review of Systems   Constitutional:  Negative for chills and fever.   Respiratory:  Negative for shortness of breath.    Cardiovascular:  Negative for chest pain.       Objective:     Exam:  /80 (BP Location: Right arm, Patient Position: Sitting, BP Cuff Size: Large adult)   Pulse 86   Temp 36.9 °C (98.4 °F) (Temporal)   Resp 16   Ht 1.702 m (5' 7\")   Wt 96.2 kg (212 lb 1 oz)   SpO2 96%   BMI 33.21 kg/m²  Body mass index is 33.21 kg/m².    Physical Exam  Vitals reviewed.   Constitutional:       General: He is not in acute distress.     Appearance: Normal appearance.   Pulmonary:      Effort: Pulmonary effort is normal.   Musculoskeletal:      Comments: Large mass noted at the left olecranon process as well as the anterior left knee.  Somewhat fluctuant.  Nontender to palpation.  See images below.   Neurological:      General: No focal deficit present.      Mental Status: He is alert.   Psychiatric:         Mood and Affect: Mood normal.       "   Behavior: Behavior normal.         Judgment: Judgment normal.       2/2016 MR left elbow:  1.  2 masses within the subcutaneous fat overlying the olecranon and proximal ulna. The largest measures 2.2 x 2.8 x 0.7 cm in size. These are characterized by low T1 and heterogeneous bright T2 signal with multiple small punctate low signal foci   scattered throughout the right T2 signal. The most likely process would be that of chronic bursitis to include bursitis related to gout an inflammatory arthropathy. A chronic infectious process or neoplastic process would be considered very unlikely.     2.  Mild tendinopathy of the triceps tendon.     3.  Small area of cartilage loss with underlying marrow edema involving the capitellum.              Assessment & Plan:     58 y.o. male with the following -     1. Mass of left elbow  Chronic, uncontrolled.  Started around 2016 or sooner.  Referring to orthopedics.    - Referral to Orthopedics    2. Knee mass, left  Chronic, uncontrolled.  Started around 2020.  Referred to orthopedics.    - Referral to Orthopedics    3. Gastroesophageal reflux disease without esophagitis  Chronic, stable.  Patient started taking this medication years ago when he had heartburn.  He is asking about stopping it.  Recommend stopping medication and then restarting dosing as needed at the lowest effective dose.  If heartburn comes back, discussed with GI when he sees him for colonoscopy.    - esomeprazole (NEXIUM) 20 MG capsule; 1 tab by mouth daily.  Dispense: 90 Capsule; Refill: 0    4. Gout, unspecified cause, unspecified chronicity, unspecified site  Chronic, stable.  Continue allopurinol 300 mg daily    - Referral to Orthopedics  - allopurinol (ZYLOPRIM) 300 MG Tab; Take 1 Tablet by mouth every day.    Dispense: 90 Tablet; Refill: 0    5. Other specified hypothyroidism  Chronic, stable.  Continue levothyroxine 50 mcg daily.    - TSH; Future  - THYROID PEROXIDASE  (TPO) AB; Future  -  ANTITHYROGLOBULIN AB; Future  - levothyroxine (SYNTHROID) 50 MCG Tab; Take 1 Tablet by mouth every morning on an empty stomach.  Dispense: 90 Tablet; Refill: 0    6. Vitamin D deficiency  Chronic, control unknown.  Checking labs.    - VITAMIN D,25 HYDROXY (DEFICIENCY); Future    7. Overweight  Chronic, uncontrolled.  Check labs.    - CBC WITH DIFFERENTIAL; Future  - Comp Metabolic Panel; Future  - Lipid Profile; Future    8. Colon cancer screening    - Referral to GI for Colonoscopy    9. Need for hepatitis C screening test    - HEP C VIRUS ANTIBODY; Future    10. Screening examination for sexually transmitted disease    - HIV AG/AB COMBO ASSAY SCREENING; Future    11. Encounter for screening for malignant neoplasm of prostate    - PROSTATE SPECIFIC AG SCREENING; Future      Have labs drawn.    Healthcare Maintenance:        Return for lab discussion.    Please note that this dictation was created using voice recognition software. I have made every reasonable attempt to correct obvious errors, but I expect that there are errors of grammar and possibly content that I did not discover before finalizing the note.

## 2023-12-11 ENCOUNTER — OFFICE VISIT (OUTPATIENT)
Dept: MEDICAL GROUP | Facility: PHYSICIAN GROUP | Age: 58
End: 2023-12-11
Payer: COMMERCIAL

## 2023-12-11 VITALS
DIASTOLIC BLOOD PRESSURE: 82 MMHG | RESPIRATION RATE: 16 BRPM | SYSTOLIC BLOOD PRESSURE: 130 MMHG | OXYGEN SATURATION: 96 % | BODY MASS INDEX: 33.74 KG/M2 | WEIGHT: 215 LBS | HEART RATE: 85 BPM | HEIGHT: 67 IN | TEMPERATURE: 98 F

## 2023-12-11 DIAGNOSIS — J10.1 INFLUENZA A: ICD-10-CM

## 2023-12-11 DIAGNOSIS — R05.1 ACUTE COUGH: ICD-10-CM

## 2023-12-11 LAB
FLUAV RNA SPEC QL NAA+PROBE: POSITIVE
FLUBV RNA SPEC QL NAA+PROBE: NEGATIVE
RSV RNA SPEC QL NAA+PROBE: NEGATIVE
SARS-COV-2 RNA RESP QL NAA+PROBE: NEGATIVE

## 2023-12-11 PROCEDURE — 0241U POCT CEPHEID COV-2, FLU A/B, RSV - PCR: CPT | Performed by: PHYSICIAN ASSISTANT

## 2023-12-11 PROCEDURE — 3075F SYST BP GE 130 - 139MM HG: CPT | Performed by: PHYSICIAN ASSISTANT

## 2023-12-11 PROCEDURE — 3079F DIAST BP 80-89 MM HG: CPT | Performed by: PHYSICIAN ASSISTANT

## 2023-12-11 PROCEDURE — 99213 OFFICE O/P EST LOW 20 MIN: CPT | Performed by: PHYSICIAN ASSISTANT

## 2023-12-11 RX ORDER — BENZONATATE 100 MG/1
100 CAPSULE ORAL 3 TIMES DAILY PRN
Qty: 60 CAPSULE | Refills: 0 | Status: SHIPPED | OUTPATIENT
Start: 2023-12-11

## 2023-12-11 NOTE — PROGRESS NOTES
"Chief Complaint   Patient presents with    Cough     Has had a cough for the last x5 days . Trouble breathing for the last 2 days and chest hurts when taking deep breath x 2 days .         HPI: Patient is a 58 y.o. male complaining of 2 days of illness including: Shortness of breath and 5 days cough. Also had chills, bodyaches, and headaches  Recently traveled to [place].   Mucus is: thin.  Had a negative COVID test at home.  Similarly ill exposures: yes.  Treatments tried: tylenol and nyquil   He  reports that he has never smoked. He quit smokeless tobacco use about 21 years ago.  His smokeless tobacco use included chew..     ROS:  No nausea, changes in bowel movements or skin rash.      I reviewed the patient's medications, allergies and medical history:  Current Outpatient Medications   Medication Sig Dispense Refill    benzonatate (TESSALON) 100 MG Cap Take 1 Capsule by mouth 3 times a day as needed for Cough. 60 Capsule 0    glucosamine Sulfate 500 MG Cap Take 1,500 mg by mouth 3 times a day with meals.      allopurinol (ZYLOPRIM) 300 MG Tab Take 1 Tablet by mouth every day.   90 Tablet 0    esomeprazole (NEXIUM) 20 MG capsule 1 tab by mouth daily. 90 Capsule 0    levothyroxine (SYNTHROID) 50 MCG Tab Take 1 Tablet by mouth every morning on an empty stomach. 90 Tablet 0    ibuprofen (MOTRIN) 200 MG Tab Take 600 mg by mouth 1 time daily as needed.      Nutritional Supplements (VITAMIN D MAINTENANCE PO) Take 2 Tabs by mouth every day.       No current facility-administered medications for this visit.     Patient has no known allergies.  Past Medical History:   Diagnosis Date    Arthritis     gout    Dental disorder     upper    Heart burn     Hypothyroidism     Indigestion     Snoring     no sleep study        EXAM:  /82   Pulse 85   Temp 36.7 °C (98 °F) (Temporal)   Resp 16   Ht 1.702 m (5' 7\")   Wt 97.5 kg (215 lb)   SpO2 96%   General: Alert, no conversational dyspnea or audible wheeze, non-toxic " appearance.  Eyes: PERRL, conjunctiva slightly injected, no eye discharge.  Ears: Normal pinnae,TM's normal bilaterally.  Nares: Patent with thin mucus.  Sinuses: non tender over maxillary / frontal sinuses.  Throat: Erythematous injection without exudate.   Neck: Supple, with no adenopathy.  Lungs: Clear to auscultation bilaterally, no wheeze, crackles or rhonchi.   Heart: Regular rate without murmur.  Skin: Warm and dry without rash.     ASSESSMENT:   1. Acute cough    2. Influenza A      PLAN:  1. Educated patient that majority of upper respiratory infections are viral and do not need antibiotics.  POCT influenza positive in the office. Outside window for treatment  2. Twice daily use of nasal saline rinse or Neti-Pot.  3. OTC anti-pyretics and decongestants as needed.  4. Follow-up in office or urgent care for worsening symptoms, difficulty breathing, lack of expected recovery, or should new symptoms or problems arise.     I have reviewed and confirmed nurses' notes for patient's medications, allergies, medical history, and surgical history.

## 2023-12-13 PROBLEM — M70.20 OLECRANON BURSITIS: Status: ACTIVE | Noted: 2023-12-13

## 2023-12-13 PROBLEM — M70.42 PREPATELLAR BURSITIS, LEFT KNEE: Status: ACTIVE | Noted: 2023-12-13

## 2023-12-20 ENCOUNTER — HOSPITAL ENCOUNTER (OUTPATIENT)
Dept: LAB | Facility: MEDICAL CENTER | Age: 58
End: 2023-12-20
Attending: PHYSICIAN ASSISTANT
Payer: COMMERCIAL

## 2023-12-20 DIAGNOSIS — E66.3 OVERWEIGHT: ICD-10-CM

## 2023-12-20 DIAGNOSIS — E55.9 VITAMIN D DEFICIENCY: ICD-10-CM

## 2023-12-20 DIAGNOSIS — Z11.3 SCREENING EXAMINATION FOR SEXUALLY TRANSMITTED DISEASE: ICD-10-CM

## 2023-12-20 DIAGNOSIS — Z12.5 ENCOUNTER FOR SCREENING FOR MALIGNANT NEOPLASM OF PROSTATE: ICD-10-CM

## 2023-12-20 DIAGNOSIS — Z11.59 NEED FOR HEPATITIS C SCREENING TEST: ICD-10-CM

## 2023-12-20 DIAGNOSIS — E03.8 OTHER SPECIFIED HYPOTHYROIDISM: ICD-10-CM

## 2023-12-20 LAB
25(OH)D3 SERPL-MCNC: 58 NG/ML (ref 30–100)
ALBUMIN SERPL BCP-MCNC: 4.4 G/DL (ref 3.2–4.9)
ALBUMIN/GLOB SERPL: 1.7 G/DL
ALP SERPL-CCNC: 80 U/L (ref 30–99)
ALT SERPL-CCNC: 41 U/L (ref 2–50)
ANION GAP SERPL CALC-SCNC: 9 MMOL/L (ref 7–16)
AST SERPL-CCNC: 37 U/L (ref 12–45)
BASOPHILS # BLD AUTO: 1.4 % (ref 0–1.8)
BASOPHILS # BLD: 0.09 K/UL (ref 0–0.12)
BILIRUB SERPL-MCNC: 0.4 MG/DL (ref 0.1–1.5)
BUN SERPL-MCNC: 7 MG/DL (ref 8–22)
CALCIUM ALBUM COR SERPL-MCNC: 9.2 MG/DL (ref 8.5–10.5)
CALCIUM SERPL-MCNC: 9.5 MG/DL (ref 8.5–10.5)
CHLORIDE SERPL-SCNC: 103 MMOL/L (ref 96–112)
CHOLEST SERPL-MCNC: 169 MG/DL (ref 100–199)
CO2 SERPL-SCNC: 28 MMOL/L (ref 20–33)
CREAT SERPL-MCNC: 0.96 MG/DL (ref 0.5–1.4)
EOSINOPHIL # BLD AUTO: 0.26 K/UL (ref 0–0.51)
EOSINOPHIL NFR BLD: 3.9 % (ref 0–6.9)
ERYTHROCYTE [DISTWIDTH] IN BLOOD BY AUTOMATED COUNT: 42.5 FL (ref 35.9–50)
FASTING STATUS PATIENT QL REPORTED: NORMAL
GFR SERPLBLD CREATININE-BSD FMLA CKD-EPI: 93 ML/MIN/1.73 M 2
GLOBULIN SER CALC-MCNC: 2.6 G/DL (ref 1.9–3.5)
GLUCOSE SERPL-MCNC: 110 MG/DL (ref 65–99)
HCT VFR BLD AUTO: 49.4 % (ref 42–52)
HCV AB SER QL: NORMAL
HDLC SERPL-MCNC: 34 MG/DL
HGB BLD-MCNC: 16.8 G/DL (ref 14–18)
HIV 1+2 AB+HIV1 P24 AG SERPL QL IA: NORMAL
IMM GRANULOCYTES # BLD AUTO: 0.03 K/UL (ref 0–0.11)
IMM GRANULOCYTES NFR BLD AUTO: 0.5 % (ref 0–0.9)
LDLC SERPL CALC-MCNC: 108 MG/DL
LYMPHOCYTES # BLD AUTO: 1.28 K/UL (ref 1–4.8)
LYMPHOCYTES NFR BLD: 19.3 % (ref 22–41)
MCH RBC QN AUTO: 30.4 PG (ref 27–33)
MCHC RBC AUTO-ENTMCNC: 34 G/DL (ref 32.3–36.5)
MCV RBC AUTO: 89.3 FL (ref 81.4–97.8)
MONOCYTES # BLD AUTO: 0.74 K/UL (ref 0–0.85)
MONOCYTES NFR BLD AUTO: 11.2 % (ref 0–13.4)
NEUTROPHILS # BLD AUTO: 4.22 K/UL (ref 1.82–7.42)
NEUTROPHILS NFR BLD: 63.7 % (ref 44–72)
NRBC # BLD AUTO: 0 K/UL
NRBC BLD-RTO: 0 /100 WBC (ref 0–0.2)
PLATELET # BLD AUTO: 243 K/UL (ref 164–446)
PMV BLD AUTO: 9.3 FL (ref 9–12.9)
POTASSIUM SERPL-SCNC: 5.1 MMOL/L (ref 3.6–5.5)
PROT SERPL-MCNC: 7 G/DL (ref 6–8.2)
PSA SERPL-MCNC: 0.42 NG/ML (ref 0–4)
RBC # BLD AUTO: 5.53 M/UL (ref 4.7–6.1)
SODIUM SERPL-SCNC: 140 MMOL/L (ref 135–145)
THYROPEROXIDASE AB SERPL-ACNC: <9 IU/ML (ref 0–9)
TRIGL SERPL-MCNC: 137 MG/DL (ref 0–149)
TSH SERPL DL<=0.005 MIU/L-ACNC: 1.69 UIU/ML (ref 0.38–5.33)
WBC # BLD AUTO: 6.6 K/UL (ref 4.8–10.8)

## 2023-12-20 PROCEDURE — 36415 COLL VENOUS BLD VENIPUNCTURE: CPT

## 2023-12-20 PROCEDURE — 82306 VITAMIN D 25 HYDROXY: CPT

## 2023-12-20 PROCEDURE — 80053 COMPREHEN METABOLIC PANEL: CPT

## 2023-12-20 PROCEDURE — 87389 HIV-1 AG W/HIV-1&-2 AB AG IA: CPT

## 2023-12-20 PROCEDURE — 86376 MICROSOMAL ANTIBODY EACH: CPT

## 2023-12-20 PROCEDURE — 86800 THYROGLOBULIN ANTIBODY: CPT

## 2023-12-20 PROCEDURE — 84443 ASSAY THYROID STIM HORMONE: CPT

## 2023-12-20 PROCEDURE — 80061 LIPID PANEL: CPT

## 2023-12-20 PROCEDURE — 85025 COMPLETE CBC W/AUTO DIFF WBC: CPT

## 2023-12-20 PROCEDURE — 84153 ASSAY OF PSA TOTAL: CPT

## 2023-12-20 PROCEDURE — 86803 HEPATITIS C AB TEST: CPT

## 2023-12-21 DIAGNOSIS — R73.01 IFG (IMPAIRED FASTING GLUCOSE): ICD-10-CM

## 2023-12-21 DIAGNOSIS — R53.83 FATIGUE, UNSPECIFIED TYPE: ICD-10-CM

## 2023-12-21 DIAGNOSIS — Z12.5 ENCOUNTER FOR SCREENING FOR MALIGNANT NEOPLASM OF PROSTATE: ICD-10-CM

## 2023-12-21 DIAGNOSIS — E78.5 DYSLIPIDEMIA: ICD-10-CM

## 2023-12-22 LAB — THYROGLOB AB SERPL-ACNC: <0.9 IU/ML (ref 0–4)

## 2024-05-31 DIAGNOSIS — E03.8 OTHER SPECIFIED HYPOTHYROIDISM: ICD-10-CM

## 2024-05-31 DIAGNOSIS — M10.9 GOUT, UNSPECIFIED CAUSE, UNSPECIFIED CHRONICITY, UNSPECIFIED SITE: ICD-10-CM

## 2024-05-31 DIAGNOSIS — K21.9 GASTROESOPHAGEAL REFLUX DISEASE WITHOUT ESOPHAGITIS: ICD-10-CM

## 2024-06-04 RX ORDER — LEVOTHYROXINE SODIUM 0.05 MG/1
50 TABLET ORAL
Qty: 90 TABLET | Refills: 1 | Status: SHIPPED | OUTPATIENT
Start: 2024-06-04

## 2024-06-04 RX ORDER — ALLOPURINOL 300 MG/1
300 TABLET ORAL DAILY
Qty: 90 TABLET | Refills: 1 | Status: SHIPPED | OUTPATIENT
Start: 2024-06-04

## 2024-12-17 ENCOUNTER — OFFICE VISIT (OUTPATIENT)
Dept: MEDICAL GROUP | Facility: PHYSICIAN GROUP | Age: 59
End: 2024-12-17
Payer: COMMERCIAL

## 2024-12-17 VITALS
OXYGEN SATURATION: 96 % | BODY MASS INDEX: 33.04 KG/M2 | HEART RATE: 70 BPM | WEIGHT: 210.5 LBS | SYSTOLIC BLOOD PRESSURE: 130 MMHG | RESPIRATION RATE: 16 BRPM | DIASTOLIC BLOOD PRESSURE: 82 MMHG | HEIGHT: 67 IN | TEMPERATURE: 98.5 F

## 2024-12-17 DIAGNOSIS — R53.83 FATIGUE, UNSPECIFIED TYPE: ICD-10-CM

## 2024-12-17 DIAGNOSIS — K21.9 GASTROESOPHAGEAL REFLUX DISEASE WITHOUT ESOPHAGITIS: ICD-10-CM

## 2024-12-17 DIAGNOSIS — M10.9 GOUT, UNSPECIFIED CAUSE, UNSPECIFIED CHRONICITY, UNSPECIFIED SITE: ICD-10-CM

## 2024-12-17 DIAGNOSIS — R39.15 URINARY URGENCY: ICD-10-CM

## 2024-12-17 DIAGNOSIS — R73.01 IFG (IMPAIRED FASTING GLUCOSE): ICD-10-CM

## 2024-12-17 DIAGNOSIS — N50.811 RIGHT TESTICULAR PAIN: ICD-10-CM

## 2024-12-17 DIAGNOSIS — Z12.5 ENCOUNTER FOR SCREENING FOR MALIGNANT NEOPLASM OF PROSTATE: ICD-10-CM

## 2024-12-17 DIAGNOSIS — R10.30 LOWER ABDOMINAL PAIN: ICD-10-CM

## 2024-12-17 DIAGNOSIS — E03.8 OTHER SPECIFIED HYPOTHYROIDISM: ICD-10-CM

## 2024-12-17 DIAGNOSIS — R42 LIGHTHEADEDNESS: ICD-10-CM

## 2024-12-17 DIAGNOSIS — E78.5 DYSLIPIDEMIA: ICD-10-CM

## 2024-12-17 PROBLEM — M70.42 PREPATELLAR BURSITIS, LEFT KNEE: Status: RESOLVED | Noted: 2023-12-13 | Resolved: 2024-12-17

## 2024-12-17 PROBLEM — M70.20 OLECRANON BURSITIS: Status: RESOLVED | Noted: 2023-12-13 | Resolved: 2024-12-17

## 2024-12-17 PROBLEM — R22.32 MASS OF LEFT ELBOW: Status: RESOLVED | Noted: 2023-11-27 | Resolved: 2024-12-17

## 2024-12-17 PROBLEM — R22.42 KNEE MASS, LEFT: Status: RESOLVED | Noted: 2023-11-27 | Resolved: 2024-12-17

## 2024-12-17 PROCEDURE — 3075F SYST BP GE 130 - 139MM HG: CPT | Performed by: PHYSICIAN ASSISTANT

## 2024-12-17 PROCEDURE — 99214 OFFICE O/P EST MOD 30 MIN: CPT | Performed by: PHYSICIAN ASSISTANT

## 2024-12-17 PROCEDURE — 93000 ELECTROCARDIOGRAM COMPLETE: CPT | Performed by: PHYSICIAN ASSISTANT

## 2024-12-17 PROCEDURE — 3079F DIAST BP 80-89 MM HG: CPT | Performed by: PHYSICIAN ASSISTANT

## 2024-12-17 RX ORDER — LEVOTHYROXINE SODIUM 50 UG/1
50 TABLET ORAL
Qty: 90 TABLET | Refills: 3 | Status: SHIPPED
Start: 2024-12-17 | End: 2024-12-17

## 2024-12-17 RX ORDER — LEVOTHYROXINE SODIUM 50 UG/1
50 TABLET ORAL
Qty: 90 TABLET | Refills: 3 | Status: SHIPPED | OUTPATIENT
Start: 2024-12-17

## 2024-12-17 RX ORDER — ALLOPURINOL 300 MG/1
300 TABLET ORAL DAILY
Qty: 90 TABLET | Refills: 3 | Status: SHIPPED | OUTPATIENT
Start: 2024-12-17

## 2024-12-17 RX ORDER — ALLOPURINOL 300 MG/1
300 TABLET ORAL DAILY
Qty: 90 TABLET | Refills: 3 | Status: SHIPPED
Start: 2024-12-17 | End: 2024-12-17

## 2024-12-17 RX ORDER — TAMSULOSIN HYDROCHLORIDE 0.4 MG/1
0.4 CAPSULE ORAL
Qty: 90 CAPSULE | Refills: 3 | Status: SHIPPED | OUTPATIENT
Start: 2024-12-17 | End: 2024-12-30 | Stop reason: SDUPTHER

## 2024-12-17 ASSESSMENT — PATIENT HEALTH QUESTIONNAIRE - PHQ9: CLINICAL INTERPRETATION OF PHQ2 SCORE: 0

## 2024-12-17 ASSESSMENT — FIBROSIS 4 INDEX: FIB4 SCORE: 1.36

## 2024-12-17 ASSESSMENT — ENCOUNTER SYMPTOMS
CHILLS: 0
SHORTNESS OF BREATH: 0
FEVER: 0

## 2024-12-17 NOTE — ASSESSMENT & PLAN NOTE
Chronic, uncontrolled.  Lightheadedness episodes.  Feels like the downside of an adrenaline rush  Will happen suddenly --> feels like an anxiety feeling, weak feeling, or when he hasn't eaten for a long time  NO chest symptoms at all (pain, pressure, heaviness, etc)  Denies SOB, diaphoresis, nausea  Episodes: maybe 10 over November/December 2024  Lasting 2-3 hours  Episodes are getting closer together  Family history of heart issues: none  Tob: never  EtOH: none  Illicits: never  Recent elk hunt (3 days ago), 2000 feet in elevation, up 1 mile  [place] these symptoms the entire time he was exerting himself

## 2024-12-17 NOTE — ASSESSMENT & PLAN NOTE
Chronic, stable.    Interval history 11/2023  Has been on Nexium 20 mg for few years.  He started it due to heartburn.  Advised that he could stop taking it and reevaluate the need.  If heartburn comes back, discussed with GI when he sees them for his colonoscopy.

## 2024-12-17 NOTE — PROGRESS NOTES
SUBJECTIVE:     CC: Follow-up chronic issues    HPI:   Sachin presents today with the following:    ASSESSMENT & PLAN by Problem:     Problem   Right Testicular Pain    Chronic, uncontrolled.  Overall, worsening.  US scrotal contents ordered.     Urinary Urgency    Chronic, uncontrolled.  Checking PSA.  Trial tamsulosin.     Lightheadedness    Chronic, uncontrolled.  EKG: NSR. Left axis deviation. No ST elevation.  Ordering labs, ECHO.  Referring to cardiology.     Gastroesophageal Reflux Disease Without Esophagitis    Chronic, stable.  Tolerating/continue Nexium 20 mg daily.     Gout    Chronic, controlled.  Tolerating/continue allopurinol 300mg daily.     Other Specified Hypothyroidism    Chronic, controlled.  Tolerating/continue levothyroxine 50mcg daily.     Prepatellar Bursitis, Left Knee (Resolved)   Olecranon Bursitis (Resolved)   Mass of Left Elbow (Resolved)    Chronic, uncontrolled.  Started around 2016 or earlier.  MRI 2016 does show a mass but nonspecific.  Was seeing FREDERIC at the time.  Referring back to Ortho     Knee Mass, Left (Resolved)    Chronic, uncontrolled.  Started around 2020.  Denies pain.  Referring to Ortho.           UA: Unremarkable    Vaccines: declines      No follow-ups on file.        Healthcare Maintenance:      HPI:     Problem List Items Addressed This Visit       Gastroesophageal reflux disease without esophagitis     Chronic, stable.    Interval history 11/2023  Has been on Nexium 20 mg for few years.  He started it due to heartburn.  Advised that he could stop taking it and reevaluate the need.  If heartburn comes back, discussed with GI when he sees them for his colonoscopy.         Relevant Medications    esomeprazole (NEXIUM) 20 MG capsule    Gout     Chronic, controlled.  Tolerating/continue allopurinol 300mg daily.         Relevant Medications    allopurinol (ZYLOPRIM) 300 MG Tab    Lightheadedness     Chronic, uncontrolled.  Lightheadedness episodes.  Feels like the downside  of an adrenaline rush  Will happen suddenly --> feels like an anxiety feeling, weak feeling, or when he hasn't eaten for a long time  NO chest symptoms at all (pain, pressure, heaviness, etc)  Denies SOB, diaphoresis, nausea  Episodes: maybe 10 over November/December 2024  Lasting 2-3 hours  Episodes are getting closer together  Family history of heart issues: none  Tob: never  EtOH: none  Illicits: never  Recent elnehal chavist (3 days ago), 2000 feet in elevation, up 1 mile  [place] these symptoms the entire time he was exerting himself         Relevant Orders    CBC WITH DIFFERENTIAL    Comp Metabolic Panel    EKG - Clinic Performed    EC-ECHOCARDIOGRAM COMPLETE W/O CONT    REFERRAL TO CARDIOLOGY    Other specified hypothyroidism     Chronic, controlled.  Tolerating/continue levothyroxine 50mcg daily.         Relevant Medications    levothyroxine (SYNTHROID) 50 MCG Tab    Right testicular pain     Chronic, uncontrolled.  RLQ pain started early 2024  Off/on  May cause issues for 3-4 days  May go away for a week or more  Only on the right side  Feels like it's coming from the right testicle  Right testicle is TTP  Saw urology for this 10-12 years ago for lump (no pain at the time)  Lump is larger; pain started early 2024  Enough pain that it makes him limp  No scrotal concerns.         Relevant Orders    [id number]    Urinary urgency     Chronic, uncontrolled.  Started early 2024.  Urinary urgency.  Minimal amount even though he feels like there's more there.  May get up 1-2 times.  More frequency during the day.           Relevant Medications    tamsulosin (FLOMAX) 0.4 MG capsule    Other Relevant Orders    PROSTATE SPECIFIC AG SCREENING    POCT Urinalysis     Other Visit Diagnoses       Lower abdominal pain        Relevant Orders    [id number] VIEWS    Encounter for screening for malignant neoplasm of prostate        Relevant Orders    PROSTATE SPECIFIC AG SCREENING    Fatigue, unspecified type        Relevant  "Orders    TSH WITH REFLEX TO FT4    IFG (impaired fasting glucose)        Relevant Orders    HEMOGLOBIN A1C    Dyslipidemia        Relevant Orders    Lipid Profile                   ROS:  Review of Systems   Constitutional:  Negative for chills and fever.   Respiratory:  Negative for shortness of breath.    Cardiovascular:  Negative for chest pain.       OBJECTIVE:     Exam:  /82 (BP Location: Right arm, Patient Position: Sitting, BP Cuff Size: Large adult)   Pulse 70   Temp 36.9 °C (98.5 °F) (Temporal)   Resp 16   Ht 1.702 m (5' 7\")   Wt 95.5 kg (210 lb 8 oz)   SpO2 96%   BMI 32.97 kg/m²  Body mass index is 32.97 kg/m².    Physical Exam  Vitals reviewed.   Constitutional:       General: He is not in acute distress.     Appearance: Normal appearance.   HENT:      Head: Normocephalic.   Eyes:      Extraocular Movements: Extraocular movements intact.      Pupils: Pupils are equal, round, and reactive to light.   Neck:      Vascular: No carotid bruit.   Cardiovascular:      Rate and Rhythm: Normal rate and regular rhythm.      Pulses: Normal pulses.      Heart sounds: No murmur heard.     No gallop.   Pulmonary:      Effort: Pulmonary effort is normal. No respiratory distress.      Breath sounds: No wheezing.   Abdominal:      Palpations: Abdomen is soft. There is no mass.      Tenderness: There is abdominal tenderness (diffuse lower abdominal TTP without guarding/rebound).   Genitourinary:     Comments: Deferred.  Sending ultrasound.  Musculoskeletal:         General: Swelling (trace pretibial pitting edema bilaterally) present.   Skin:     General: Skin is warm and dry.   Neurological:      General: No focal deficit present.      Mental Status: He is alert and oriented to person, place, and time.   Psychiatric:         Mood and Affect: Mood normal.         Behavior: Behavior normal.         Judgment: Judgment normal.                  Please note that this dictation was created using voice recognition " software. I have made every reasonable attempt to correct obvious errors, but I expect that there are errors of grammar and possibly content that I did not discover before finalizing the note.

## 2024-12-17 NOTE — ASSESSMENT & PLAN NOTE
Chronic, uncontrolled.  RLQ pain started early 2024  Off/on  May cause issues for 3-4 days  May go away for a week or more  Only on the right side  Feels like it's coming from the right testicle  Right testicle is TTP  Saw urology for this 10-12 years ago for lump (no pain at the time)  Lump is larger; pain started early 2024  Enough pain that it makes him limp  No scrotal concerns.

## 2024-12-18 ENCOUNTER — APPOINTMENT (OUTPATIENT)
Dept: RADIOLOGY | Facility: MEDICAL CENTER | Age: 59
End: 2024-12-18
Attending: PHYSICIAN ASSISTANT
Payer: COMMERCIAL

## 2024-12-18 DIAGNOSIS — N50.811 RIGHT TESTICULAR PAIN: ICD-10-CM

## 2024-12-18 PROCEDURE — 76870 US EXAM SCROTUM: CPT

## 2024-12-19 ENCOUNTER — HOSPITAL ENCOUNTER (OUTPATIENT)
Dept: CARDIOLOGY | Facility: MEDICAL CENTER | Age: 59
End: 2024-12-19
Attending: PHYSICIAN ASSISTANT
Payer: COMMERCIAL

## 2024-12-19 DIAGNOSIS — R93.1 ABNORMAL ECHOCARDIOGRAM: ICD-10-CM

## 2024-12-19 DIAGNOSIS — N44.2 TESTICULAR CYST: ICD-10-CM

## 2024-12-19 DIAGNOSIS — R42 LIGHTHEADEDNESS: ICD-10-CM

## 2024-12-19 LAB
LV EJECT FRACT  99904: 65
LV EJECT FRACT MOD 2C 99903: 62.18
LV EJECT FRACT MOD 4C 99902: 65.01
LV EJECT FRACT MOD BP 99901: 63.42

## 2024-12-19 PROCEDURE — 93306 TTE W/DOPPLER COMPLETE: CPT | Mod: 26 | Performed by: INTERNAL MEDICINE

## 2024-12-19 PROCEDURE — 93306 TTE W/DOPPLER COMPLETE: CPT

## 2024-12-20 ENCOUNTER — OFFICE VISIT (OUTPATIENT)
Dept: CARDIOLOGY | Facility: MEDICAL CENTER | Age: 59
End: 2024-12-20
Attending: PHYSICIAN ASSISTANT
Payer: COMMERCIAL

## 2024-12-20 ENCOUNTER — HOSPITAL ENCOUNTER (OUTPATIENT)
Dept: RADIOLOGY | Facility: MEDICAL CENTER | Age: 59
End: 2024-12-20
Attending: PHYSICIAN ASSISTANT
Payer: COMMERCIAL

## 2024-12-20 VITALS
HEIGHT: 67 IN | WEIGHT: 210 LBS | SYSTOLIC BLOOD PRESSURE: 138 MMHG | BODY MASS INDEX: 32.96 KG/M2 | HEART RATE: 86 BPM | DIASTOLIC BLOOD PRESSURE: 78 MMHG | OXYGEN SATURATION: 96 %

## 2024-12-20 DIAGNOSIS — R10.30 LOWER ABDOMINAL PAIN: ICD-10-CM

## 2024-12-20 DIAGNOSIS — R06.09 DYSPNEA ON EXERTION: ICD-10-CM

## 2024-12-20 DIAGNOSIS — R42 LIGHTHEADEDNESS: ICD-10-CM

## 2024-12-20 DIAGNOSIS — E78.6 LOW HDL (UNDER 40): ICD-10-CM

## 2024-12-20 DIAGNOSIS — R93.1 ABNORMAL ECHOCARDIOGRAM: ICD-10-CM

## 2024-12-20 PROCEDURE — 99204 OFFICE O/P NEW MOD 45 MIN: CPT | Performed by: INTERNAL MEDICINE

## 2024-12-20 PROCEDURE — 74019 RADEX ABDOMEN 2 VIEWS: CPT

## 2024-12-20 PROCEDURE — 99212 OFFICE O/P EST SF 10 MIN: CPT | Performed by: INTERNAL MEDICINE

## 2024-12-20 PROCEDURE — 3075F SYST BP GE 130 - 139MM HG: CPT | Performed by: INTERNAL MEDICINE

## 2024-12-20 PROCEDURE — 3078F DIAST BP <80 MM HG: CPT | Performed by: INTERNAL MEDICINE

## 2024-12-20 ASSESSMENT — ENCOUNTER SYMPTOMS
PND: 0
HEARTBURN: 1
COUGH: 0
PALPITATIONS: 0
DIZZINESS: 1
FALLS: 0
BRUISES/BLEEDS EASILY: 0
ABDOMINAL PAIN: 1
CLAUDICATION: 0
FOCAL WEAKNESS: 0
FEVER: 0
CHILLS: 0
SORE THROAT: 0
BACK PAIN: 1
NAUSEA: 0
SHORTNESS OF BREATH: 1
WEAKNESS: 0
BLURRED VISION: 0

## 2024-12-20 ASSESSMENT — FIBROSIS 4 INDEX: FIB4 SCORE: 1.36

## 2024-12-20 NOTE — PROGRESS NOTES
Spoke with Dr. Camarillo.  Possible left atrial mass on Echo.  Recommends ltd echo with contrast.  Ordering stat.  Spoke with patient on the phone.  Will schedule.    Also scrotal US  2.2 cm complicated cyst head of the right epididymis.   Urgent referral placed to urology

## 2024-12-20 NOTE — PATIENT INSTRUCTIONS
Work on at least 2.5 - 5 hours a week of moderate exercise (typical brisk walking or similar activity)    If you have had a heart attack, stent, bypass or reduced heart strength (EF <35%): cardiac rehab may reduce your risk of dying by 13-24% and need to go to the hospital by 30% within the first year (1)    Please look into the following diets and incorporate them into your diet    LOW SALT DIET   KEEP YOUR SODIUM EQUAL TO CALORIES AND NO MORE THAN DOUBLE THE CALORIES FOR A LOW SALT DIET    Cardiosmart.org - great resource for American College of Cardiology on heart disease prevention and treatment    FOR TREATMENT OR PREVENTION OF CORONARY ARTERY DISEASE  These three programs are approved by Medicare/Insurers for those with heart disease  [place]  Dr. Murray's Program for Reversing Heart Disease - Kang Guillen's Cardiologist vegetarian-based  [facility] - [place]-based mind-body Program    Mediterranean Diet has been shown to be a hearty healthy diet.    This is a commonly referenced Program  Dr No - Sophy over Yogesh (book and documentary) - vegetarian-based    FOR TREATMENT OF BLOOD PRESSURE  DASH DIET - American Heart Association for treatment of HYPERTENSION    FOR TREATMENT OF BAD CHOLESTEROL/FATS  REDUCE PROCESSED SUGAR AS MUCH AS POSSIBLE  INCREASE WHOLE GRAINS/VEGETABLES  INCREASE FIBER    Lowering total cholesterol and LDL (bad) cholesterol:  - Eat leaner cuts of meat, or eliminate altogether if possible red meat, and frequently substitute fish or chicken.  - Limit saturated fat to no more than 7-10% of total calories no more than 10 g per day is recommended. Some sources of saturated fat include butter, animal fats, hydrogenated vegetable fats and oils, many desserts, whole milk dairy products.  - Replaced saturated fats with polyunsaturated fats and monounsaturated fats. Foods high in monounsaturated fat include nuts, canola  oil, avocados, and olives.  - Limit trans fat (processed foods) and replaced with fresh fruits and vegetables  - Recommend nonfat dairy products  - Increase substantially the amount of soluble fiber intake (legumes such as beans, fruit, whole grains).  - Consider nutritional supplements: plant sterile spreads such as Benecol, fish oil,  flaxseed oil, omega-3 acids capsules 1000 mg twice a day, or viscous fiber such as Metamucil  - Attain ideal weight and regular exercise (at least 30 minutes per day of moderate exercise)  ASK ABOUT STATIN OR NON STATIN MEDICATION TO REDUCE YOUR LDL AND HEART RISK    Lowering triglycerides:  - Reduce intake of simple sugar: Desserts, candy, pastries, honey, sodas, sugared cereals, yogurt, Gatorade, sports bars, canned fruit, smoothies, fruit juice, coffee drinks  - Reduced intake of refined starches: Refined Pasta, most bread  - Reduce or abstain from alcohol  - Increase omega-3 fatty acids: [place]pine, Trout, Mackerel, Herring, [place] tuna and supplements  - Attain ideal weight and regular exercise (at least 30 minutes per day of moderate exercise)  ASK ABOUT PURIFIED OMEGA 3 EPA or FISH OIL TO REDUCE YOUR TG AND HEART RISK    Elevating HDL (good) cholesterol:  - Increase physical activity  - Increase omega-3 fatty acids and supplements as listed above  - Incorporating appropriate amounts of monounsaturated fats such as nuts, olive oil, canola oil, avocados, olives  - Stop smoking  - Attain ideal weight and regular exercise (at least 30 minutes per day of moderate exercise)

## 2024-12-20 NOTE — PROGRESS NOTES
Chief Complaint   Patient presents with    Lightheadedness    Establish Care       Subjective     Sachin Mandujano is a 59 y.o. male who presents today  in consultation from Gill Mo P.A.-C. for evaluation of for lightheadededness and recent RITCHIE last saw cardiology in 2017.  Echocardiogram yesterday suggested possible LA mass but likely lipomatous intratrial septum      He also has a scrotal cyst and needs to see urology      Past Medical History:   Diagnosis Date    Arthritis     gout    Dental disorder     upper    Heart burn     Hypothyroidism     Indigestion     Snoring     no sleep study     Past Surgical History:   Procedure Laterality Date    PB REMOVAL OF ELBOW BURSA Left 1/2/2024    Procedure: LEFT ELBOW OPEN OLECRANON BURSA EXCISION, LEFT KNEE PREPATELLA BURSA EXCISION;  Surgeon: Khanh Valdez M.D.;  Location: [facility] [place];  Service: Orthopedics    CARPAL TUNNEL RELEASE Left 12/13/2017    Procedure: CARPAL TUNNEL RELEASE;  Surgeon: Cirilo Rodriguez M.D.;  Location: SURGERY [facility];  Service: Orthopedics    MASS EXCISION ORTHO  12/13/2017    Procedure: MASS EXCISION ORTHO;  Surgeon: Cirilo Rodriguez M.D.;  Location: SURGERY [facility];  Service: Orthopedics    CARPAL TUNNEL RELEASE Right 10/25/2017    Procedure: CARPAL TUNNEL RELEASE;  Surgeon: Cirilo Rodriguez M.D.;  Location: SURGERY [facility];  Service: Orthopedics    LAPAROSCOPIC UMBILICAL HERNIA REPAIR  1/24/2013    Performed by Junior Dodd M.D. at [facility]    HERNIA REPAIR       Family History   Problem Relation Age of Onset    Hypertension Father     Diabetes Unknown     Heart Disease Unknown     Hypertension Unknown      Social History     Socioeconomic History    Marital status:      Spouse name: Not on file    Number of children: Not on file    Years of education: Not on file    Highest education level: Not on file   Occupational History    Not on file   Tobacco  Use    Smoking status: Never    Smokeless tobacco: Former     Types: Chew     Quit date: 10/23/2002   Vaping Use    Vaping status: Never Used   Substance and Sexual Activity    Alcohol use: No    Drug use: No    Sexual activity: Not on file   Other Topics Concern    Not on file   Social History Narrative    Not on file     Social Drivers of Health     Financial Resource Strain: Not on file   Food Insecurity: Not on file   Transportation Needs: Not on file   Physical Activity: Not on file   Stress: Not on file   Social Connections: Not on file   Intimate Partner Violence: Not on file   Housing Stability: Not on file     No Known Allergies  Outpatient Encounter Medications as of 12/20/2024   Medication Sig Dispense Refill    tamsulosin (FLOMAX) 0.4 MG capsule Take 1 Capsule by mouth 1/2 hour after breakfast. 90 Capsule 3    allopurinol (ZYLOPRIM) 300 MG Tab Take 1 Tablet by mouth every day. 90 Tablet 3    esomeprazole (NEXIUM) 20 MG capsule TAKE 1 CAPSULE DAILY 90 Capsule 3    levothyroxine (SYNTHROID) 50 MCG Tab Take 1 Tablet by mouth every morning on an empty stomach. 90 Tablet 3    glucosamine Sulfate 500 MG Cap Take 1,500 mg by mouth 3 times a day with meals.      ibuprofen (MOTRIN) 200 MG Tab Take 600 mg by mouth 1 time daily as needed.      Nutritional Supplements (VITAMIN D MAINTENANCE PO) Take 2 Tabs by mouth every day.      [DISCONTINUED] benzonatate (TESSALON) 100 MG Cap Take 1 Capsule by mouth 3 times a day as needed for Cough. (Patient not taking: Reported on 12/20/2024) 60 Capsule 0     No facility-administered encounter medications on file as of 12/20/2024.     Review of Systems   Constitutional:  Negative for chills and fever.   HENT:  Positive for nosebleeds and tinnitus. Negative for sore throat.    Eyes:  Negative for blurred vision.   Respiratory:  Positive for shortness of breath. Negative for cough.    Cardiovascular:  Negative for chest pain, palpitations, claudication, leg swelling and PND.  "  Gastrointestinal:  Positive for abdominal pain and heartburn. Negative for nausea.   Musculoskeletal:  Positive for back pain and joint pain. Negative for falls.   Skin:  Negative for rash.   Neurological:  Positive for dizziness. Negative for focal weakness and weakness.   Endo/Heme/Allergies:  Does not bruise/bleed easily.              Objective     /78 (BP Location: Left arm, Patient Position: Sitting, BP Cuff Size: Adult)   Pulse 86   Ht 1.702 m (5' 7\")   Wt 95.3 kg (210 lb)   SpO2 96%   BMI 32.89 kg/m²     Physical Exam  Constitutional:       General: He is not in acute distress.     Appearance: He is not diaphoretic.   Eyes:      General: No scleral icterus.  Neck:      Vascular: No JVD.   Cardiovascular:      Rate and Rhythm: Normal rate.      Heart sounds: Normal heart sounds. No murmur heard.     No friction rub. No gallop.   Pulmonary:      Effort: No respiratory distress.      Breath sounds: No wheezing or rales.   Abdominal:      General: Bowel sounds are normal.      Palpations: Abdomen is soft.   Musculoskeletal:      Right lower leg: No edema.      Left lower leg: No edema.   Skin:     Findings: No rash.   Neurological:      Mental Status: He is alert. Mental status is at baseline.   Psychiatric:         Mood and Affect: Mood normal.            We reviewed in person the most recent labs  Recent Results (from the past 30 weeks)   EC-ECHOCARDIOGRAM COMPLETE W/O CONT    Collection Time: 12/19/24  3:02 PM   Result Value Ref Range    Eject.Frac. MOD BP 63.42     Eject.Frac. MOD 4C 65.01     Eject.Frac. MOD 2C 62.18     Left Ventrical Ejection Fraction 65          Assessment & Plan     1. Lightheadedness        2. Dyspnea on exertion  EC-ECHOCARDIOGRAM REST/STRESS W/O CONT(DOES NOT INCLUDE A FULL RESTING ECHO)      3. Abnormal echocardiogram  MR-CARDIAC MORPH/FUNC WITH & W/O      4. Low HDL (under 40)            Medical Decision Making: Today's Assessment/Status/Plan:        It was my pleasure " to meet with Mr. Mandujano.    Blood pressure is well controlled.  He will continue to monitor and eat hearty healthy diet.    LDL normal    Will do cardiac MRI    Sttress echo for RITCHIE    We will follow up with Mr. Mandujano on the results of the testing with MyChart or over the phone. We will determine further follow-up from there.    It is my pleasure to participate in the care of Mr. Mandujano.  Please do not hesitate to contact me with questions or concerns.    Loki Alves MD PhD [facility]  Cardiologist [facility]    Please note that this dictation was created using voice recognition software. There may be errors I did not discover before finalizing the note.     12/20/2024  8:46 AM

## 2024-12-30 ENCOUNTER — OFFICE VISIT (OUTPATIENT)
Dept: UROLOGY | Facility: MEDICAL CENTER | Age: 59
End: 2024-12-30
Payer: COMMERCIAL

## 2024-12-30 DIAGNOSIS — N45.1 EPIDIDYMITIS: ICD-10-CM

## 2024-12-30 DIAGNOSIS — R39.15 URINARY URGENCY: ICD-10-CM

## 2024-12-30 RX ORDER — TAMSULOSIN HYDROCHLORIDE 0.4 MG/1
0.4 CAPSULE ORAL DAILY
Qty: 90 CAPSULE | Refills: 3 | Status: SHIPPED | OUTPATIENT
Start: 2024-12-30

## 2024-12-30 RX ORDER — LEVOFLOXACIN 500 MG/1
500 TABLET, FILM COATED ORAL DAILY
Qty: 14 TABLET | Refills: 0 | Status: SHIPPED | OUTPATIENT
Start: 2024-12-30

## 2024-12-30 NOTE — PROGRESS NOTES
Chief Complaint: scrotal mass    The patient was referred by Gill Mo P.A.-C. for evaluation of the above chief complaint    History of Present Illness:    Sachin Mandujano is a 59 y.o. male who presents today for scrotal mass  - Seen by PCP for urinary symptoms + testicular pain  - Symptoms include urgency, weak stream, pelvic pressure  - Symptoms 12+ months  - Started tamsulosin and scrotal US obtained  - Reports some improvement after 3 weeks on the tamsulosin  - Epididymal cyst x 10 years (but smaller at that time)  - Not painful in the past  - No hematuria or UTI symptoms    Subjective    Family History   Problem Relation Age of Onset    Hypertension Father     Diabetes Unknown     Heart Disease Unknown     Hypertension Unknown      Social History     Socioeconomic History    Marital status:      Spouse name: Not on file    Number of children: Not on file    Years of education: Not on file    Highest education level: Not on file   Occupational History    Not on file   Tobacco Use    Smoking status: Never    Smokeless tobacco: Former     Types: Chew     Quit date: 10/23/2002   Vaping Use    Vaping status: Never Used   Substance and Sexual Activity    Alcohol use: No    Drug use: No    Sexual activity: Not on file   Other Topics Concern    Not on file   Social History Narrative    Not on file     Social Drivers of Health     Financial Resource Strain: Not on file   Food Insecurity: Not on file   Transportation Needs: Not on file   Physical Activity: Not on file   Stress: Not on file   Social Connections: Not on file   Intimate Partner Violence: Not on file   Housing Stability: Not on file     Past Surgical History:   Procedure Laterality Date    PB REMOVAL OF ELBOW BURSA Left 1/2/2024    Procedure: LEFT ELBOW OPEN OLECRANON BURSA EXCISION, LEFT KNEE PREPATELLA BURSA EXCISION;  Surgeon: Khanh Valdez M.D.;  Location: [facility];  Service: Orthopedics    CARPAL TUNNEL RELEASE Left  12/13/2017    Procedure: CARPAL TUNNEL RELEASE;  Surgeon: Cirilo Rodriguez M.D.;  Location: SURGERY [facility];  Service: Orthopedics    MASS EXCISION ORTHO  12/13/2017    Procedure: MASS EXCISION ORTHO;  Surgeon: Cirilo Rodriguez M.D.;  Location: SURGERY [facility];  Service: Orthopedics    CARPAL TUNNEL RELEASE Right 10/25/2017    Procedure: CARPAL TUNNEL RELEASE;  Surgeon: Cirilo Rodriguez M.D.;  Location: SURGERY [facility];  Service: Orthopedics    LAPAROSCOPIC UMBILICAL HERNIA REPAIR  1/24/2013    Performed by Junior Dodd M.D. at [facility]    HERNIA REPAIR       Past Medical History:   Diagnosis Date    Arthritis     gout    Dental disorder     upper    Heart burn     Hypothyroidism     Indigestion     Snoring     no sleep study     Current Outpatient Medications   Medication Sig Dispense Refill    tamsulosin (FLOMAX) 0.4 MG capsule Take 1 Capsule by mouth 1/2 hour after breakfast. 90 Capsule 3    allopurinol (ZYLOPRIM) 300 MG Tab Take 1 Tablet by mouth every day. 90 Tablet 3    esomeprazole (NEXIUM) 20 MG capsule TAKE 1 CAPSULE DAILY 90 Capsule 3    levothyroxine (SYNTHROID) 50 MCG Tab Take 1 Tablet by mouth every morning on an empty stomach. 90 Tablet 3    glucosamine Sulfate 500 MG Cap Take 1,500 mg by mouth 3 times a day with meals.      ibuprofen (MOTRIN) 200 MG Tab Take 600 mg by mouth 1 time daily as needed.      Nutritional Supplements (VITAMIN D MAINTENANCE PO) Take 2 Tabs by mouth every day.       No current facility-administered medications for this visit.     No Known Allergies    Review of systems was conducted and was negative except for as explicitly listed in the History of Present Illness.       Objective   There were no vitals taken for this visit.  Physical Exam  Vitals reviewed.   HENT:      Head: Normocephalic.      Nose: Nose normal.      Mouth/Throat:      Pharynx: Oropharynx is clear.   Eyes:      Conjunctiva/sclera: Conjunctivae  normal.   Cardiovascular:      Rate and Rhythm: Normal rate.      Pulses: Normal pulses.   Pulmonary:      Effort: Pulmonary effort is normal.   Abdominal:      Palpations: Abdomen is soft.   Genitourinary:     Comments: R epididymal head cyst palpable with tenderness, also along the cord on the R  Musculoskeletal:         General: Normal range of motion.      Cervical back: Neck supple.   Skin:     General: Skin is warm.   Neurological:      Mental Status: He is alert. Mental status is at baseline.   Psychiatric:         Mood and Affect: Mood normal.         Lab/Radiology/Diagnostic Review:  CBC   Lab Results   Component Value Date/Time    WBC 6.6 12/20/2023 0712    RBC 5.53 12/20/2023 0712    HEMOGLOBIN 16.8 12/20/2023 0712    HEMATOCRIT 49.4 12/20/2023 0712    MCV 89.3 12/20/2023 0712    MCH 30.4 12/20/2023 0712    MCHC 34.0 12/20/2023 0712    RDW 42.5 12/20/2023 0712    MPV 9.3 12/20/2023 0712    LYMPHOCYTES 19.30 (L) 12/20/2023 0712    LYMPHS 1.28 12/20/2023 0712    MONOCYTES 11.20 12/20/2023 0712    MONOS 0.74 12/20/2023 0712    EOSINOPHILS 3.90 12/20/2023 0712    EOS 0.26 12/20/2023 0712    BASOPHILS 1.40 12/20/2023 0712    BASO 0.09 12/20/2023 0712    NRBC 0.00 12/20/2023 0712       BMP   Lab Results   Component Value Date/Time    SODIUM 140 12/20/2023 0712    POTASSIUM 5.1 12/20/2023 0712    CHLORIDE 103 12/20/2023 0712    CO2 28 12/20/2023 0712    GLUCOSE 110 (H) 12/20/2023 0712    BUN 7 (L) 12/20/2023 0712    CREATININE 0.96 12/20/2023 0712    CALCIUM 9.5 12/20/2023 0712     PSA   Lab Results   Component Value Date/Time    PSATOTAL 0.42 12/20/2023 0712     US SCROTAL   [id number] 12/18/2024    Narrative  12/18/2024 2:18 PM    HISTORY/REASON FOR EXAM: Pain; right testicular pain/mass.    TECHNIQUE/EXAM DESCRIPTION:  Real-time sonography of the scrotum was performed with gray-scale, color and duplex Doppler imaging.    COMPARISON: None    FINDINGS:    The right testis measures 3.93 cm x 2.36 cm x  3.26 cm. Normal in size and echotexture. Normal vascularity on color Doppler. No intratesticular mass.    The left testis measures 3.90 cm x 1.90 cm x 2.75 cm. Normal in size and echotexture. Normal vascularity on color Doppler. No intratesticular mass.    Vascular flow is symmetric.    There is a 2.2 x 2.1 x 2.1 cm complicated cyst within the head of the right epididymis.  Left epididymis appears within normal limits.    Small left hydrocele is detected .    No varicocele is detected.    Impression  1.  No testicular mass.    2.  2.2 cm complicated cyst head of the right epididymis.    I have reviewed and independently examined the lab and imaging results.  My findings are given in the HPI or above with the associated tests.        Assessment & Plan    It was a pleasure speaking with Sachin MEDINA [place] today.    Sachin Mandujano is a 59 y.o. male w/ urinary symptoms and incidental epididymal cyst  1) Urinary symptoms   - Discussed AUA guidelines   - Discussed medication options, including alpha blockers, 5-DOMINICK, and PDE5i   - Discussed tamsulosin as a first line medication   - Plan to continue with tamsulosin   - Discussed risks, benefits, alternatives, and side effects to this medication   - Likely BPH/LUTS with prostatitis --> epididymitis  2) Epididymal cyst  - Discussed unlikely to be malignant ~0% risk  - Possible this is infectious given complexity  - Recommend antibiotics x 2 weeks an repeat US in 2-3 months  - If persistent and bothersome, can consider surgical excision (ie epididymectomy)  - Risks, benefits, alternatives of the procedure were discussed with the patient.  Specifically, we reviewed the perioperative expectations.

## 2024-12-31 ENCOUNTER — HOSPITAL ENCOUNTER (OUTPATIENT)
Dept: LAB | Facility: MEDICAL CENTER | Age: 59
End: 2024-12-31
Attending: PHYSICIAN ASSISTANT
Payer: COMMERCIAL

## 2024-12-31 DIAGNOSIS — Z12.5 ENCOUNTER FOR SCREENING FOR MALIGNANT NEOPLASM OF PROSTATE: ICD-10-CM

## 2024-12-31 DIAGNOSIS — R42 LIGHTHEADEDNESS: ICD-10-CM

## 2024-12-31 DIAGNOSIS — E78.5 DYSLIPIDEMIA: ICD-10-CM

## 2024-12-31 DIAGNOSIS — R53.83 FATIGUE, UNSPECIFIED TYPE: ICD-10-CM

## 2024-12-31 DIAGNOSIS — R73.01 IFG (IMPAIRED FASTING GLUCOSE): ICD-10-CM

## 2024-12-31 DIAGNOSIS — R39.15 URINARY URGENCY: ICD-10-CM

## 2024-12-31 LAB
ALBUMIN SERPL BCP-MCNC: 4.4 G/DL (ref 3.2–4.9)
ALBUMIN/GLOB SERPL: 1.6 G/DL
ALP SERPL-CCNC: 86 U/L (ref 30–99)
ALT SERPL-CCNC: 27 U/L (ref 2–50)
ANION GAP SERPL CALC-SCNC: 8 MMOL/L (ref 7–16)
AST SERPL-CCNC: 21 U/L (ref 12–45)
BASOPHILS # BLD AUTO: 1.4 % (ref 0–1.8)
BASOPHILS # BLD: 0.08 K/UL (ref 0–0.12)
BILIRUB SERPL-MCNC: 0.6 MG/DL (ref 0.1–1.5)
BUN SERPL-MCNC: 13 MG/DL (ref 8–22)
CALCIUM ALBUM COR SERPL-MCNC: 9.4 MG/DL (ref 8.5–10.5)
CALCIUM SERPL-MCNC: 9.7 MG/DL (ref 8.4–10.2)
CHLORIDE SERPL-SCNC: 105 MMOL/L (ref 96–112)
CHOLEST SERPL-MCNC: 192 MG/DL (ref 100–199)
CO2 SERPL-SCNC: 28 MMOL/L (ref 20–33)
CREAT SERPL-MCNC: 1.12 MG/DL (ref 0.5–1.4)
EOSINOPHIL # BLD AUTO: 0.25 K/UL (ref 0–0.51)
EOSINOPHIL NFR BLD: 4.5 % (ref 0–6.9)
ERYTHROCYTE [DISTWIDTH] IN BLOOD BY AUTOMATED COUNT: 42.5 FL (ref 35.9–50)
EST. AVERAGE GLUCOSE BLD GHB EST-MCNC: 103 MG/DL
FASTING STATUS PATIENT QL REPORTED: NORMAL
GFR SERPLBLD CREATININE-BSD FMLA CKD-EPI: 76 ML/MIN/1.73 M 2
GLOBULIN SER CALC-MCNC: 2.8 G/DL (ref 1.9–3.5)
GLUCOSE SERPL-MCNC: 114 MG/DL (ref 65–99)
HBA1C MFR BLD: 5.2 % (ref 4–5.6)
HCT VFR BLD AUTO: 50.7 % (ref 42–52)
HDLC SERPL-MCNC: 47 MG/DL
HGB BLD-MCNC: 17.5 G/DL (ref 14–18)
IMM GRANULOCYTES # BLD AUTO: 0.02 K/UL (ref 0–0.11)
IMM GRANULOCYTES NFR BLD AUTO: 0.4 % (ref 0–0.9)
LDLC SERPL CALC-MCNC: 130 MG/DL
LYMPHOCYTES # BLD AUTO: 1.31 K/UL (ref 1–4.8)
LYMPHOCYTES NFR BLD: 23.4 % (ref 22–41)
MCH RBC QN AUTO: 31.3 PG (ref 27–33)
MCHC RBC AUTO-ENTMCNC: 34.5 G/DL (ref 32.3–36.5)
MCV RBC AUTO: 90.7 FL (ref 81.4–97.8)
MONOCYTES # BLD AUTO: 0.64 K/UL (ref 0–0.85)
MONOCYTES NFR BLD AUTO: 11.4 % (ref 0–13.4)
NEUTROPHILS # BLD AUTO: 3.3 K/UL (ref 1.82–7.42)
NEUTROPHILS NFR BLD: 58.9 % (ref 44–72)
NRBC # BLD AUTO: 0 K/UL
NRBC BLD-RTO: 0 /100 WBC (ref 0–0.2)
PLATELET # BLD AUTO: 167 K/UL (ref 164–446)
PMV BLD AUTO: 9.1 FL (ref 9–12.9)
POTASSIUM SERPL-SCNC: 5.2 MMOL/L (ref 3.6–5.5)
PROT SERPL-MCNC: 7.2 G/DL (ref 6–8.2)
PSA SERPL-MCNC: 0.41 NG/ML (ref 0–4)
RBC # BLD AUTO: 5.59 M/UL (ref 4.7–6.1)
SODIUM SERPL-SCNC: 141 MMOL/L (ref 135–145)
TRIGL SERPL-MCNC: 77 MG/DL (ref 0–149)
TSH SERPL DL<=0.005 MIU/L-ACNC: 3.14 UIU/ML (ref 0.38–5.33)
WBC # BLD AUTO: 5.6 K/UL (ref 4.8–10.8)

## 2024-12-31 PROCEDURE — 83036 HEMOGLOBIN GLYCOSYLATED A1C: CPT

## 2024-12-31 PROCEDURE — 85025 COMPLETE CBC W/AUTO DIFF WBC: CPT

## 2024-12-31 PROCEDURE — 36415 COLL VENOUS BLD VENIPUNCTURE: CPT

## 2024-12-31 PROCEDURE — 80053 COMPREHEN METABOLIC PANEL: CPT

## 2024-12-31 PROCEDURE — 84443 ASSAY THYROID STIM HORMONE: CPT

## 2024-12-31 PROCEDURE — 80061 LIPID PANEL: CPT

## 2024-12-31 PROCEDURE — 84153 ASSAY OF PSA TOTAL: CPT

## 2025-01-13 ENCOUNTER — PATIENT MESSAGE (OUTPATIENT)
Dept: UROLOGY | Facility: MEDICAL CENTER | Age: 60
End: 2025-01-13
Payer: COMMERCIAL

## 2025-01-13 DIAGNOSIS — N45.1 EPIDIDYMITIS: ICD-10-CM

## 2025-01-28 ENCOUNTER — PATIENT MESSAGE (OUTPATIENT)
Dept: UROLOGY | Facility: MEDICAL CENTER | Age: 60
End: 2025-01-28
Payer: COMMERCIAL

## 2025-01-28 DIAGNOSIS — N45.1 EPIDIDYMITIS: ICD-10-CM

## 2025-01-28 DIAGNOSIS — R39.15 URINARY URGENCY: ICD-10-CM

## 2025-01-28 RX ORDER — TAMSULOSIN HYDROCHLORIDE 0.4 MG/1
0.4 CAPSULE ORAL DAILY
Qty: 90 CAPSULE | Refills: 3 | Status: SHIPPED | OUTPATIENT
Start: 2025-01-28

## 2025-02-06 ENCOUNTER — APPOINTMENT (OUTPATIENT)
Dept: MEDICAL GROUP | Facility: PHYSICIAN GROUP | Age: 60
End: 2025-02-06
Payer: COMMERCIAL

## 2025-02-12 ENCOUNTER — TELEPHONE (OUTPATIENT)
Dept: CARDIOLOGY | Facility: MEDICAL CENTER | Age: 60
End: 2025-02-12
Payer: COMMERCIAL

## 2025-02-12 DIAGNOSIS — R06.09 DYSPNEA ON EXERTION: ICD-10-CM

## 2025-02-12 DIAGNOSIS — R93.1 ABNORMAL ECHOCARDIOGRAM: ICD-10-CM

## 2025-02-12 NOTE — TELEPHONE ENCOUNTER
Phone Number Called: [phone]    Call outcome: Spoke to patient regarding message below.    Message: Called to inform patient insurance denied stress echo. Advised of CW's recommendations. Pt verbalized understanding.

## 2025-02-12 NOTE — TELEPHONE ENCOUNTER
Let him know his insurance declined stress echo he will have to do the treadmill and he has the cardiac MRI scheduled

## 2025-02-12 NOTE — TELEPHONE ENCOUNTER
To CW, insurance is denying stress echo. Would you like to try a different ICD code or order another test? ~thank you

## 2025-02-12 NOTE — TELEPHONE ENCOUNTER
CW    Caller: Sachin Mandujano    Topic/issue: Patient called because he received a letter from his insurance [employer], stating that cannot approve the Echo Stress Test without further information from his provider. He is requesting a callback from a nurse to touch base on if he needs this test done or not. Patient has an appointment tomorrow.     Please advise.      Callback Number: [phone]    Thank you,     Angie CONNOR

## 2025-02-13 ENCOUNTER — APPOINTMENT (OUTPATIENT)
Dept: CARDIOLOGY | Facility: MEDICAL CENTER | Age: 60
End: 2025-02-13
Attending: INTERNAL MEDICINE
Payer: COMMERCIAL

## 2025-02-25 ENCOUNTER — HOSPITAL ENCOUNTER (OUTPATIENT)
Dept: RADIOLOGY | Facility: MEDICAL CENTER | Age: 60
End: 2025-02-25
Attending: INTERNAL MEDICINE
Payer: COMMERCIAL

## 2025-02-25 ENCOUNTER — RESULTS FOLLOW-UP (OUTPATIENT)
Dept: CARDIOLOGY | Facility: MEDICAL CENTER | Age: 60
End: 2025-02-25

## 2025-02-25 DIAGNOSIS — I10 PRIMARY HYPERTENSION: ICD-10-CM

## 2025-02-25 DIAGNOSIS — R93.1 ABNORMAL ECHOCARDIOGRAM: ICD-10-CM

## 2025-02-25 DIAGNOSIS — R06.09 DYSPNEA ON EXERTION: ICD-10-CM

## 2025-02-25 PROCEDURE — 93017 CV STRESS TEST TRACING ONLY: CPT | Mod: TC

## 2025-03-04 ENCOUNTER — HOSPITAL ENCOUNTER (OUTPATIENT)
Dept: RADIOLOGY | Facility: MEDICAL CENTER | Age: 60
End: 2025-03-04
Attending: INTERNAL MEDICINE
Payer: COMMERCIAL

## 2025-03-04 DIAGNOSIS — R93.1 ABNORMAL ECHOCARDIOGRAM: ICD-10-CM

## 2025-04-01 ENCOUNTER — HOSPITAL ENCOUNTER (OUTPATIENT)
Dept: RADIOLOGY | Facility: MEDICAL CENTER | Age: 60
End: 2025-04-01
Attending: UROLOGY
Payer: COMMERCIAL

## 2025-04-01 DIAGNOSIS — N45.1 EPIDIDYMITIS: ICD-10-CM

## 2025-04-01 PROCEDURE — 76870 US EXAM SCROTUM: CPT

## 2025-04-02 ENCOUNTER — RESULTS FOLLOW-UP (OUTPATIENT)
Dept: UROLOGY | Facility: MEDICAL CENTER | Age: 60
End: 2025-04-02

## 2025-04-02 DIAGNOSIS — N45.1 EPIDIDYMITIS: ICD-10-CM

## 2025-04-02 RX ORDER — LEVOFLOXACIN 500 MG/1
500 TABLET, FILM COATED ORAL DAILY
Qty: 14 TABLET | Refills: 0 | Status: SHIPPED | OUTPATIENT
Start: 2025-04-02 | End: 2025-04-04 | Stop reason: SDUPTHER

## 2025-04-04 ENCOUNTER — OFFICE VISIT (OUTPATIENT)
Dept: UROLOGY | Facility: MEDICAL CENTER | Age: 60
End: 2025-04-04
Payer: COMMERCIAL

## 2025-04-04 DIAGNOSIS — N45.1 EPIDIDYMITIS: ICD-10-CM

## 2025-04-04 DIAGNOSIS — N50.811 RIGHT TESTICULAR PAIN: ICD-10-CM

## 2025-04-04 PROCEDURE — 99214 OFFICE O/P EST MOD 30 MIN: CPT | Performed by: UROLOGY

## 2025-04-04 RX ORDER — LEVOFLOXACIN 500 MG/1
500 TABLET, FILM COATED ORAL DAILY
Qty: 14 TABLET | Refills: 0 | Status: SHIPPED | OUTPATIENT
Start: 2025-04-04

## 2025-04-04 NOTE — PROGRESS NOTES
Chief Complaint: right epididymal cyst    History of Present Illness:    Sachin Mandujano is a 59 y.o. male who presents today for follow-up  - Right epididymal cyst diagnosed previously  - Associated with pain/epididymitis  - Treated him for prostatitis and epididymitis previously with improvement  - Now with repeat pain from the right epididymal cyst with enlargement  - Continues on the tamsulosin  - Started antibiotics 2 days ago, some benefit    Subjective    Family History   Problem Relation Age of Onset    Hypertension Father     Diabetes Unknown     Heart Disease Unknown     Hypertension Unknown      Social History     Socioeconomic History    Marital status:      Spouse name: Not on file    Number of children: Not on file    Years of education: Not on file    Highest education level: Not on file   Occupational History    Not on file   Tobacco Use    Smoking status: Never    Smokeless tobacco: Former     Types: Chew     Quit date: 10/23/2002   Vaping Use    Vaping status: Never Used   Substance and Sexual Activity    Alcohol use: No    Drug use: No    Sexual activity: Not on file   Other Topics Concern    Not on file   Social History Narrative    Not on file     Social Drivers of Health     Financial Resource Strain: Not on file   Food Insecurity: Not on file   Transportation Needs: Not on file   Physical Activity: Not on file   Stress: Not on file   Social Connections: Not on file   Intimate Partner Violence: Not on file   Housing Stability: Not on file     Past Surgical History:   Procedure Laterality Date    PB REMOVAL OF ELBOW BURSA Left 1/2/2024    Procedure: LEFT ELBOW OPEN OLECRANON BURSA EXCISION, LEFT KNEE PREPATELLA BURSA EXCISION;  Surgeon: Khanh Valdez M.D.;  Location: [facility];  Service: Orthopedics    CARPAL TUNNEL RELEASE Left 12/13/2017    Procedure: CARPAL TUNNEL RELEASE;  Surgeon: Cirilo Rodriguez M.D.;  Location: SURGERY [facility];  Service: Orthopedics     MASS EXCISION ORTHO  12/13/2017    Procedure: MASS EXCISION ORTHO;  Surgeon: Cirilo Rodriguez M.D.;  Location: SURGERY [facility];  Service: Orthopedics    CARPAL TUNNEL RELEASE Right 10/25/2017    Procedure: CARPAL TUNNEL RELEASE;  Surgeon: Cirilo Rodriguez M.D.;  Location: SURGERY [facility];  Service: Orthopedics    LAPAROSCOPIC UMBILICAL HERNIA REPAIR  1/24/2013    Performed by Junior Dodd M.D. at SURGERY [facility]    HERNIA REPAIR       Past Medical History:   Diagnosis Date    Arthritis     gout    Dental disorder     upper    Heart burn     Hypothyroidism     Indigestion     Snoring     no sleep study     Current Outpatient Medications   Medication Sig Dispense Refill    tamsulosin (FLOMAX) 0.4 MG capsule Take 1 Capsule by mouth every day. 90 Capsule 3    levoFLOXacin (LEVAQUIN) 500 MG tablet Take 1 Tablet by mouth every day. 14 Tablet 0    allopurinol (ZYLOPRIM) 300 MG Tab Take 1 Tablet by mouth every day. 90 Tablet 3    esomeprazole (NEXIUM) 20 MG capsule TAKE 1 CAPSULE DAILY 90 Capsule 3    levothyroxine (SYNTHROID) 50 MCG Tab Take 1 Tablet by mouth every morning on an empty stomach. 90 Tablet 3    glucosamine Sulfate 500 MG Cap Take 1,500 mg by mouth 3 times a day with meals.      ibuprofen (MOTRIN) 200 MG Tab Take 600 mg by mouth 1 time daily as needed.      Nutritional Supplements (VITAMIN D MAINTENANCE PO) Take 2 Tabs by mouth every day.       No current facility-administered medications for this visit.     No Known Allergies    Review of systems was conducted and was negative except for as explicitly listed in the History of Present Illness.       Objective   Lab/Radiology/Diagnostic Review:  CBC   Lab Results   Component Value Date/Time    WBC 5.6 12/31/2024 0828    RBC 5.59 12/31/2024 0828    HEMOGLOBIN 17.5 12/31/2024 0828    HEMATOCRIT 50.7 12/31/2024 0828    MCV 90.7 12/31/2024 0828    MCH 31.3 12/31/2024 0828    MCHC 34.5 12/31/2024 0828    RDW 42.5 12/31/2024  0828    MPV 9.1 12/31/2024 0828    LYMPHOCYTES 23.40 12/31/2024 0828    LYMPHS 1.31 12/31/2024 0828    MONOCYTES 11.40 12/31/2024 0828    MONOS 0.64 12/31/2024 0828    EOSINOPHILS 4.50 12/31/2024 0828    EOS 0.25 12/31/2024 0828    BASOPHILS 1.40 12/31/2024 0828    BASO 0.08 12/31/2024 0828    NRBC 0.00 12/31/2024 0828       BMP   Lab Results   Component Value Date/Time    SODIUM 141 12/31/2024 0828    POTASSIUM 5.2 12/31/2024 0828    CHLORIDE 105 12/31/2024 0828    CO2 28 12/31/2024 0828    GLUCOSE 114 (H) 12/31/2024 0828    BUN 13 12/31/2024 0828    CREATININE 1.12 12/31/2024 0828    CALCIUM 9.7 12/31/2024 0828     PSA   Lab Results   Component Value Date/Time    PSATOTAL 0.41 12/31/2024 0828     US SCROTAL   [id number] 04/01/2025    Narrative  4/1/2025 2:52 PM    HISTORY/REASON FOR EXAM: Swelling; Assess for changes after antibiotic course.    TECHNIQUE/EXAM DESCRIPTION:  Real-time sonography of the scrotum was performed with gray-scale, color and duplex Doppler imaging.    COMPARISON: 12/18/2024    FINDINGS:    The right testis measures 4.0 x 2.2 x 3.5 cm. Normal in size and echotexture. Normal vascularity on color Doppler. No intratesticular mass.    The left testis measures 4.2 x 2.4 x 2.7 cm. Normal in size and echotexture. Normal vascularity on color Doppler. No intratesticular mass.    Vascular flow is symmetric.    There is a 2.6 x 2.0 x 2.2 cm complicated cyst with diffuse internal echoes located within the head of the right epididymis. Previously measured 2.2 x 2.1 x 2.1 cm.    Small bilateral hydroceles.    There are bilateral varicoceles.    Impression  1.  No testicular mass.    2.  2.6 cm complicated cyst head of the right epididymis previously measured 2.2 cm.    I have reviewed and independently examined the lab and imaging results.  My findings are given in the HPI or above with the associated tests.        Assessment & Plan    It was a pleasure speaking with Sachin Mandujano  today.    Sachin Mandujano is a 59 y.o. male w/ painful RIGHT epididymal cyst  - Discussed this may be contributing to recurrent infections and epididymitis  - Continue antibiotics at this time  - Offered epididymectomy given he does not desire fertility and this appears to be the source of the pain and infection  - Risks, benefits, alternatives were discussed with the patient.  Specifically, we reviewed the perioperative expectations.  All questions answered.  They understand and agree to proceed.

## 2025-04-04 NOTE — H&P (VIEW-ONLY)
Chief Complaint: right epididymal cyst    History of Present Illness:    Sachin Mandujano is a 59 y.o. male who presents today for follow-up  - Right epididymal cyst diagnosed previously  - Associated with pain/epididymitis  - Treated him for prostatitis and epididymitis previously with improvement  - Now with repeat pain from the right epididymal cyst with enlargement  - Continues on the tamsulosin  - Started antibiotics 2 days ago, some benefit    Subjective    Family History   Problem Relation Age of Onset    Hypertension Father     Diabetes Unknown     Heart Disease Unknown     Hypertension Unknown      Social History     Socioeconomic History    Marital status:      Spouse name: Not on file    Number of children: Not on file    Years of education: Not on file    Highest education level: Not on file   Occupational History    Not on file   Tobacco Use    Smoking status: Never    Smokeless tobacco: Former     Types: Chew     Quit date: 10/23/2002   Vaping Use    Vaping status: Never Used   Substance and Sexual Activity    Alcohol use: No    Drug use: No    Sexual activity: Not on file   Other Topics Concern    Not on file   Social History Narrative    Not on file     Social Drivers of Health     Financial Resource Strain: Not on file   Food Insecurity: Not on file   Transportation Needs: Not on file   Physical Activity: Not on file   Stress: Not on file   Social Connections: Not on file   Intimate Partner Violence: Not on file   Housing Stability: Not on file     Past Surgical History:   Procedure Laterality Date    PB REMOVAL OF ELBOW BURSA Left 1/2/2024    Procedure: LEFT ELBOW OPEN OLECRANON BURSA EXCISION, LEFT KNEE PREPATELLA BURSA EXCISION;  Surgeon: Khanh Valdez M.D.;  Location: [facility];  Service: Orthopedics    CARPAL TUNNEL RELEASE Left 12/13/2017    Procedure: CARPAL TUNNEL RELEASE;  Surgeon: Cirilo Rodriguez M.D.;  Location: SURGERY [facility];  Service: Orthopedics     MASS EXCISION ORTHO  12/13/2017    Procedure: MASS EXCISION ORTHO;  Surgeon: Cirilo Rodriguez M.D.;  Location: SURGERY [facility];  Service: Orthopedics    CARPAL TUNNEL RELEASE Right 10/25/2017    Procedure: CARPAL TUNNEL RELEASE;  Surgeon: Cirilo Rodriguez M.D.;  Location: SURGERY [facility];  Service: Orthopedics    LAPAROSCOPIC UMBILICAL HERNIA REPAIR  1/24/2013    Performed by Junior Dodd M.D. at SURGERY [facility]    HERNIA REPAIR       Past Medical History:   Diagnosis Date    Arthritis     gout    Dental disorder     upper    Heart burn     Hypothyroidism     Indigestion     Snoring     no sleep study     Current Outpatient Medications   Medication Sig Dispense Refill    tamsulosin (FLOMAX) 0.4 MG capsule Take 1 Capsule by mouth every day. 90 Capsule 3    levoFLOXacin (LEVAQUIN) 500 MG tablet Take 1 Tablet by mouth every day. 14 Tablet 0    allopurinol (ZYLOPRIM) 300 MG Tab Take 1 Tablet by mouth every day. 90 Tablet 3    esomeprazole (NEXIUM) 20 MG capsule TAKE 1 CAPSULE DAILY 90 Capsule 3    levothyroxine (SYNTHROID) 50 MCG Tab Take 1 Tablet by mouth every morning on an empty stomach. 90 Tablet 3    glucosamine Sulfate 500 MG Cap Take 1,500 mg by mouth 3 times a day with meals.      ibuprofen (MOTRIN) 200 MG Tab Take 600 mg by mouth 1 time daily as needed.      Nutritional Supplements (VITAMIN D MAINTENANCE PO) Take 2 Tabs by mouth every day.       No current facility-administered medications for this visit.     No Known Allergies    Review of systems was conducted and was negative except for as explicitly listed in the History of Present Illness.       Objective   Lab/Radiology/Diagnostic Review:  CBC   Lab Results   Component Value Date/Time    WBC 5.6 12/31/2024 0828    RBC 5.59 12/31/2024 0828    HEMOGLOBIN 17.5 12/31/2024 0828    HEMATOCRIT 50.7 12/31/2024 0828    MCV 90.7 12/31/2024 0828    MCH 31.3 12/31/2024 0828    MCHC 34.5 12/31/2024 0828    RDW 42.5 12/31/2024  0828    MPV 9.1 12/31/2024 0828    LYMPHOCYTES 23.40 12/31/2024 0828    LYMPHS 1.31 12/31/2024 0828    MONOCYTES 11.40 12/31/2024 0828    MONOS 0.64 12/31/2024 0828    EOSINOPHILS 4.50 12/31/2024 0828    EOS 0.25 12/31/2024 0828    BASOPHILS 1.40 12/31/2024 0828    BASO 0.08 12/31/2024 0828    NRBC 0.00 12/31/2024 0828       BMP   Lab Results   Component Value Date/Time    SODIUM 141 12/31/2024 0828    POTASSIUM 5.2 12/31/2024 0828    CHLORIDE 105 12/31/2024 0828    CO2 28 12/31/2024 0828    GLUCOSE 114 (H) 12/31/2024 0828    BUN 13 12/31/2024 0828    CREATININE 1.12 12/31/2024 0828    CALCIUM 9.7 12/31/2024 0828     PSA   Lab Results   Component Value Date/Time    PSATOTAL 0.41 12/31/2024 0828     US SCROTAL   [id number] 04/01/2025    Narrative  4/1/2025 2:52 PM    HISTORY/REASON FOR EXAM: Swelling; Assess for changes after antibiotic course.    TECHNIQUE/EXAM DESCRIPTION:  Real-time sonography of the scrotum was performed with gray-scale, color and duplex Doppler imaging.    COMPARISON: 12/18/2024    FINDINGS:    The right testis measures 4.0 x 2.2 x 3.5 cm. Normal in size and echotexture. Normal vascularity on color Doppler. No intratesticular mass.    The left testis measures 4.2 x 2.4 x 2.7 cm. Normal in size and echotexture. Normal vascularity on color Doppler. No intratesticular mass.    Vascular flow is symmetric.    There is a 2.6 x 2.0 x 2.2 cm complicated cyst with diffuse internal echoes located within the head of the right epididymis. Previously measured 2.2 x 2.1 x 2.1 cm.    Small bilateral hydroceles.    There are bilateral varicoceles.    Impression  1.  No testicular mass.    2.  2.6 cm complicated cyst head of the right epididymis previously measured 2.2 cm.    I have reviewed and independently examined the lab and imaging results.  My findings are given in the HPI or above with the associated tests.        Assessment & Plan    It was a pleasure speaking with Sachin Mandujano  today.    Sachin Mandujano is a 59 y.o. male w/ painful RIGHT epididymal cyst  - Discussed this may be contributing to recurrent infections and epididymitis  - Continue antibiotics at this time  - Offered epididymectomy given he does not desire fertility and this appears to be the source of the pain and infection  - Risks, benefits, alternatives were discussed with the patient.  Specifically, we reviewed the perioperative expectations.  All questions answered.  They understand and agree to proceed.

## 2025-04-08 ENCOUNTER — APPOINTMENT (OUTPATIENT)
Dept: ADMISSIONS | Facility: MEDICAL CENTER | Age: 60
End: 2025-04-08
Attending: UROLOGY
Payer: COMMERCIAL

## 2025-04-11 ENCOUNTER — PRE-ADMISSION TESTING (OUTPATIENT)
Dept: ADMISSIONS | Facility: MEDICAL CENTER | Age: 60
End: 2025-04-11
Attending: UROLOGY
Payer: COMMERCIAL

## 2025-04-11 NOTE — PREADMIT AVS NOTE
Current Medications   Medication Instructions    tamsulosin (FLOMAX) 0.4 MG capsule Continue taking medication as prescribed    levoFLOXacin (LEVAQUIN) 500 MG tablet Continue taking medication as prescribed or follow instructions from surgeon/prescribing provider    allopurinol (ZYLOPRIM) 300 MG Tab Continue taking medication as prescribed    esomeprazole (NEXIUM) 20 MG capsule Continue taking medication as prescribed    levothyroxine (SYNTHROID) 50 MCG Tab Continue taking medication as prescribed    glucosamine Sulfate 500 MG Cap Stop 7 days before surgery    ibuprofen (MOTRIN) 200 MG Tab Stop 5 days before surgery    Nutritional Supplements (VITAMIN D MAINTENANCE PO) Stop 7 days before surgery

## 2025-04-11 NOTE — OR NURSING
PAT done with pt for upcoming procedure with Dr. Tan on 04/16/2025.    Med instructions given to patient per Guidelines for Pre-operative Medication Management. Medication list with written instructions sent to pt via [employer]. Pt also advised to hold vitamins for 7 days prior to surgery and hold NSAIDs for 5 days prior to surgery.    Pre-procedure packet reviewed with patient. Patient to follow fasting guidelines/bathing instructions given per anesthesia/surgeon. Patient verbalized understanding of all instructions.     Encouraged increased oral fluid intake the day prior to procedure/surgery (if not fluid restricted) including intake of electrolyte drinks such as Gatorade or electrolyte water. Patient may have clear liquids until 2 hours prior to surgery.     Patient has no further questions at this time.

## 2025-04-15 ENCOUNTER — TELEPHONE (OUTPATIENT)
Dept: UROLOGY | Facility: MEDICAL CENTER | Age: 60
End: 2025-04-15
Payer: COMMERCIAL

## 2025-04-16 ENCOUNTER — PHARMACY VISIT (OUTPATIENT)
Dept: PHARMACY | Facility: MEDICAL CENTER | Age: 60
End: 2025-04-16
Payer: COMMERCIAL

## 2025-04-16 ENCOUNTER — ANESTHESIA (OUTPATIENT)
Dept: SURGERY | Facility: MEDICAL CENTER | Age: 60
End: 2025-04-16
Payer: COMMERCIAL

## 2025-04-16 ENCOUNTER — ANESTHESIA EVENT (OUTPATIENT)
Dept: SURGERY | Facility: MEDICAL CENTER | Age: 60
End: 2025-04-16
Payer: COMMERCIAL

## 2025-04-16 ENCOUNTER — HOSPITAL ENCOUNTER (OUTPATIENT)
Facility: MEDICAL CENTER | Age: 60
End: 2025-04-16
Attending: UROLOGY | Admitting: UROLOGY
Payer: COMMERCIAL

## 2025-04-16 VITALS
TEMPERATURE: 97.2 F | HEART RATE: 70 BPM | SYSTOLIC BLOOD PRESSURE: 123 MMHG | DIASTOLIC BLOOD PRESSURE: 68 MMHG | RESPIRATION RATE: 18 BRPM | OXYGEN SATURATION: 92 % | HEIGHT: 67 IN | WEIGHT: 218.26 LBS | BODY MASS INDEX: 34.26 KG/M2

## 2025-04-16 DIAGNOSIS — N50.811 RIGHT TESTICULAR PAIN: ICD-10-CM

## 2025-04-16 LAB
GRAM STN SPEC: NORMAL
PATHOLOGY CONSULT NOTE: NORMAL
SIGNIFICANT IND 70042: NORMAL
SITE SITE: NORMAL
SOURCE SOURCE: NORMAL

## 2025-04-16 PROCEDURE — 160035 HCHG PACU - 1ST 60 MINS PHASE I: Performed by: UROLOGY

## 2025-04-16 PROCEDURE — A9270 NON-COVERED ITEM OR SERVICE: HCPCS | Performed by: ANESTHESIOLOGY

## 2025-04-16 PROCEDURE — 87075 CULTR BACTERIA EXCEPT BLOOD: CPT

## 2025-04-16 PROCEDURE — 160015 HCHG STAT PREOP MINUTES: Performed by: UROLOGY

## 2025-04-16 PROCEDURE — 160025 RECOVERY II MINUTES (STATS): Performed by: UROLOGY

## 2025-04-16 PROCEDURE — 160028 HCHG SURGERY MINUTES - 1ST 30 MINS LEVEL 3: Performed by: UROLOGY

## 2025-04-16 PROCEDURE — 160047 HCHG PACU  - EA ADDL 30 MINS PHASE II: Performed by: UROLOGY

## 2025-04-16 PROCEDURE — 160009 HCHG ANES TIME/MIN: Performed by: UROLOGY

## 2025-04-16 PROCEDURE — 87205 SMEAR GRAM STAIN: CPT

## 2025-04-16 PROCEDURE — RXMED WILLOW AMBULATORY MEDICATION CHARGE: Performed by: UROLOGY

## 2025-04-16 PROCEDURE — 700111 HCHG RX REV CODE 636 W/ 250 OVERRIDE (IP): Mod: JZ | Performed by: ANESTHESIOLOGY

## 2025-04-16 PROCEDURE — 700111 HCHG RX REV CODE 636 W/ 250 OVERRIDE (IP): Performed by: UROLOGY

## 2025-04-16 PROCEDURE — 700101 HCHG RX REV CODE 250: Performed by: ANESTHESIOLOGY

## 2025-04-16 PROCEDURE — 160048 HCHG OR STATISTICAL LEVEL 1-5: Performed by: UROLOGY

## 2025-04-16 PROCEDURE — 88305 TISSUE EXAM BY PATHOLOGIST: CPT | Mod: 26 | Performed by: PATHOLOGY

## 2025-04-16 PROCEDURE — 160046 HCHG PACU - 1ST 60 MINS PHASE II: Performed by: UROLOGY

## 2025-04-16 PROCEDURE — 160039 HCHG SURGERY MINUTES - EA ADDL 1 MIN LEVEL 3: Performed by: UROLOGY

## 2025-04-16 PROCEDURE — 160002 HCHG RECOVERY MINUTES (STAT): Performed by: UROLOGY

## 2025-04-16 PROCEDURE — 87070 CULTURE OTHR SPECIMN AEROBIC: CPT

## 2025-04-16 PROCEDURE — 700102 HCHG RX REV CODE 250 W/ 637 OVERRIDE(OP): Performed by: ANESTHESIOLOGY

## 2025-04-16 PROCEDURE — 700105 HCHG RX REV CODE 258: Performed by: ANESTHESIOLOGY

## 2025-04-16 PROCEDURE — 88305 TISSUE EXAM BY PATHOLOGIST: CPT | Performed by: PATHOLOGY

## 2025-04-16 PROCEDURE — 700111 HCHG RX REV CODE 636 W/ 250 OVERRIDE (IP): Performed by: ANESTHESIOLOGY

## 2025-04-16 RX ORDER — SODIUM CHLORIDE, SODIUM LACTATE, POTASSIUM CHLORIDE, CALCIUM CHLORIDE 600; 310; 30; 20 MG/100ML; MG/100ML; MG/100ML; MG/100ML
INJECTION, SOLUTION INTRAVENOUS
Status: DISCONTINUED | OUTPATIENT
Start: 2025-04-16 | End: 2025-04-16 | Stop reason: SURG

## 2025-04-16 RX ORDER — HALOPERIDOL 5 MG/ML
1 INJECTION INTRAMUSCULAR
Status: DISCONTINUED | OUTPATIENT
Start: 2025-04-16 | End: 2025-04-16 | Stop reason: HOSPADM

## 2025-04-16 RX ORDER — ACETAMINOPHEN 500 MG
1000 TABLET ORAL ONCE
Status: COMPLETED | OUTPATIENT
Start: 2025-04-16 | End: 2025-04-16

## 2025-04-16 RX ORDER — LIDOCAINE HYDROCHLORIDE 20 MG/ML
INJECTION, SOLUTION EPIDURAL; INFILTRATION; INTRACAUDAL; PERINEURAL PRN
Status: DISCONTINUED | OUTPATIENT
Start: 2025-04-16 | End: 2025-04-16 | Stop reason: SURG

## 2025-04-16 RX ORDER — CEFAZOLIN SODIUM 1 G/3ML
INJECTION, POWDER, FOR SOLUTION INTRAMUSCULAR; INTRAVENOUS PRN
Status: DISCONTINUED | OUTPATIENT
Start: 2025-04-16 | End: 2025-04-16 | Stop reason: SURG

## 2025-04-16 RX ORDER — BUPIVACAINE HYDROCHLORIDE 2.5 MG/ML
INJECTION, SOLUTION EPIDURAL; INFILTRATION; INTRACAUDAL; PERINEURAL
Status: DISCONTINUED | OUTPATIENT
Start: 2025-04-16 | End: 2025-04-16 | Stop reason: HOSPADM

## 2025-04-16 RX ORDER — MIDAZOLAM HYDROCHLORIDE 1 MG/ML
INJECTION INTRAMUSCULAR; INTRAVENOUS PRN
Status: DISCONTINUED | OUTPATIENT
Start: 2025-04-16 | End: 2025-04-16 | Stop reason: SURG

## 2025-04-16 RX ORDER — OXYCODONE HCL 5 MG/5 ML
5 SOLUTION, ORAL ORAL
Status: COMPLETED | OUTPATIENT
Start: 2025-04-16 | End: 2025-04-16

## 2025-04-16 RX ORDER — ONDANSETRON 2 MG/ML
4 INJECTION INTRAMUSCULAR; INTRAVENOUS
Status: DISCONTINUED | OUTPATIENT
Start: 2025-04-16 | End: 2025-04-16 | Stop reason: HOSPADM

## 2025-04-16 RX ORDER — EPHEDRINE SULFATE 50 MG/ML
INJECTION, SOLUTION INTRAVENOUS PRN
Status: DISCONTINUED | OUTPATIENT
Start: 2025-04-16 | End: 2025-04-16 | Stop reason: SURG

## 2025-04-16 RX ORDER — KETOROLAC TROMETHAMINE 15 MG/ML
15 INJECTION, SOLUTION INTRAMUSCULAR; INTRAVENOUS ONCE
Status: COMPLETED | OUTPATIENT
Start: 2025-04-16 | End: 2025-04-16

## 2025-04-16 RX ORDER — DIPHENHYDRAMINE HYDROCHLORIDE 50 MG/ML
12.5 INJECTION, SOLUTION INTRAMUSCULAR; INTRAVENOUS
Status: DISCONTINUED | OUTPATIENT
Start: 2025-04-16 | End: 2025-04-16 | Stop reason: HOSPADM

## 2025-04-16 RX ORDER — TRAMADOL HYDROCHLORIDE 50 MG/1
50 TABLET ORAL EVERY 6 HOURS PRN
Qty: 15 TABLET | Refills: 0 | Status: SHIPPED | OUTPATIENT
Start: 2025-04-16 | End: 2025-04-23

## 2025-04-16 RX ORDER — BUPIVACAINE HYDROCHLORIDE 2.5 MG/ML
INJECTION, SOLUTION EPIDURAL; INFILTRATION; INTRACAUDAL; PERINEURAL
Status: DISCONTINUED
Start: 2025-04-16 | End: 2025-04-16 | Stop reason: HOSPADM

## 2025-04-16 RX ORDER — ONDANSETRON 2 MG/ML
INJECTION INTRAMUSCULAR; INTRAVENOUS PRN
Status: DISCONTINUED | OUTPATIENT
Start: 2025-04-16 | End: 2025-04-16 | Stop reason: SURG

## 2025-04-16 RX ORDER — OXYCODONE HCL 5 MG/5 ML
10 SOLUTION, ORAL ORAL
Status: COMPLETED | OUTPATIENT
Start: 2025-04-16 | End: 2025-04-16

## 2025-04-16 RX ADMIN — FENTANYL CITRATE 25 MCG: 50 INJECTION, SOLUTION INTRAMUSCULAR; INTRAVENOUS at 14:05

## 2025-04-16 RX ADMIN — FENTANYL CITRATE 25 MCG: 50 INJECTION, SOLUTION INTRAMUSCULAR; INTRAVENOUS at 14:18

## 2025-04-16 RX ADMIN — MIDAZOLAM HYDROCHLORIDE 2 MG: 1 INJECTION, SOLUTION INTRAMUSCULAR; INTRAVENOUS at 12:58

## 2025-04-16 RX ADMIN — KETOROLAC TROMETHAMINE 15 MG: 15 INJECTION, SOLUTION INTRAMUSCULAR; INTRAVENOUS at 15:53

## 2025-04-16 RX ADMIN — ACETAMINOPHEN 1000 MG: 500 TABLET, FILM COATED ORAL at 11:58

## 2025-04-16 RX ADMIN — ONDANSETRON 4 MG: 2 INJECTION INTRAMUSCULAR; INTRAVENOUS at 12:58

## 2025-04-16 RX ADMIN — OXYCODONE HYDROCHLORIDE 5 MG: 5 SOLUTION ORAL at 14:20

## 2025-04-16 RX ADMIN — FENTANYL CITRATE 25 MCG: 50 INJECTION, SOLUTION INTRAMUSCULAR; INTRAVENOUS at 14:44

## 2025-04-16 RX ADMIN — PROPOFOL 150 MG: 10 INJECTION, EMULSION INTRAVENOUS at 12:58

## 2025-04-16 RX ADMIN — FENTANYL CITRATE 50 MCG: 50 INJECTION, SOLUTION INTRAMUSCULAR; INTRAVENOUS at 12:58

## 2025-04-16 RX ADMIN — LIDOCAINE HYDROCHLORIDE 100 MG: 20 INJECTION, SOLUTION EPIDURAL; INFILTRATION; INTRACAUDAL; PERINEURAL at 12:58

## 2025-04-16 RX ADMIN — SODIUM CHLORIDE, POTASSIUM CHLORIDE, SODIUM LACTATE AND CALCIUM CHLORIDE: 600; 310; 30; 20 INJECTION, SOLUTION INTRAVENOUS at 12:53

## 2025-04-16 RX ADMIN — OXYCODONE HYDROCHLORIDE 5 MG: 5 SOLUTION ORAL at 14:05

## 2025-04-16 RX ADMIN — CEFAZOLIN 2 G: 1 INJECTION, POWDER, FOR SOLUTION INTRAMUSCULAR; INTRAVENOUS at 12:53

## 2025-04-16 RX ADMIN — EPHEDRINE SULFATE 10 MG: 50 INJECTION, SOLUTION INTRAVENOUS at 13:42

## 2025-04-16 RX ADMIN — EPHEDRINE SULFATE 10 MG: 50 INJECTION, SOLUTION INTRAVENOUS at 13:12

## 2025-04-16 ASSESSMENT — PAIN DESCRIPTION - PAIN TYPE
TYPE: SURGICAL PAIN

## 2025-04-16 ASSESSMENT — FIBROSIS 4 INDEX: FIB4 SCORE: 1.38

## 2025-04-16 ASSESSMENT — PAIN SCALES - GENERAL: PAIN_LEVEL: 2

## 2025-04-16 NOTE — INTERVAL H&P NOTE
Consented Procedure: RIGHT SPERMATOCELECTOMY, RIGHT HYDROCELECTOMY, HYDROCELECTOMY, ADULT  I have examined the patient, provided the risks, benefits, and alternatives to the procedure(s) indicated on the signed consent form, and the patient wishes to proceed.    H&P reviewed. The patient was examined and there are no changes to the H&P.    Izaiah Tan M.D.  04/16/25 12:37 PM

## 2025-04-16 NOTE — DISCHARGE INSTRUCTIONS
What to Expect Post Anesthesia    Rest and take it easy for the first 24 hours.  A responsible adult is recommended to remain with you during that time.  It is normal to feel sleepy.  We encourage you to not do anything that requires balance, judgment or coordination.    FOR 24 HOURS DO NOT:  Drive, operate machinery or run household appliances.  Drink beer or alcoholic beverages.  Make important decisions or sign legal documents.    To avoid nausea, slowly advance diet as tolerated, avoiding spicy or greasy foods for the first day.  Add more substantial food to your diet according to your provider's instructions.  Babies can be fed formula or breast milk as soon as they are hungry.  INCREASE FLUIDS AND FIBER TO AVOID CONSTIPATION.    MILD FLU-LIKE SYMPTOMS ARE NORMAL.  YOU MAY EXPERIENCE GENERALIZED MUSCLE ACHES, THROAT IRRITATION, HEADACHE AND/OR SOME NAUSEA.    Last pain medication given:  Tylenol given at 12pm, can take another dose of tylenol at 6pm  Oxycodone given at 2:20pm, can take a dose of tramadol at 8:20pm  Toradol (like ibuprofen/motrin) given at 4pm, can take another dose of ibuprofen at 10pm

## 2025-04-16 NOTE — ANESTHESIA POSTPROCEDURE EVALUATION
Patient: Sachin Mandujano    Procedure Summary       Date: 04/16/25 Room / Location: [facility] ROOM 25 / SURGERY SAME DAY [facility]    Anesthesia Start: 1253 Anesthesia Stop: 1400    Procedures:       RIGHT SPERMATOCELECTOMY, RIGHT HYDROCELECTOMY (Right: Scrotum)      HYDROCELECTOMY, ADULT (Right: Scrotum) Diagnosis: (BENIGN NEOPLASM OF SCROTUM)    Surgeons: Izaiah Tan M.D. Responsible Provider: Yefri Adame M.D.    Anesthesia Type: general ASA Status: 2            Final Anesthesia Type: general  Last vitals  BP   Blood Pressure: 123/64    Temp   36.2 °C (97.2 °F)    Pulse   78   Resp        SpO2   93 %      Anesthesia Post Evaluation    Patient location during evaluation: PACU  Patient participation: complete - patient participated  Level of consciousness: awake and alert  Pain score: 2    Airway patency: patent  Anesthetic complications: no  Cardiovascular status: hemodynamically stable  Respiratory status: acceptable  Hydration status: euvolemic    PONV: none          There were no known notable events for this encounter.     Nurse Pain Score: 0 (NPRS)

## 2025-04-16 NOTE — ANESTHESIA TIME REPORT
Anesthesia Start and Stop Event Times       Date Time Event    4/16/2025 1237 Ready for Procedure     1253 Anesthesia Start     1400 Anesthesia Stop          Responsible Staff  04/16/25      Name Role Begin End    Yefri Adame M.D. Anesth 1253 1400          Overtime Reason:  no overtime (within assigned shift)    Comments:

## 2025-04-16 NOTE — ANESTHESIA PROCEDURE NOTES
Airway    Date/Time: 4/16/2025 12:58 PM    Performed by: Yefri Adame M.D.  Authorized by: Yefri Adame M.D.    Location:  OR  Urgency:  Elective  Indications for Airway Management:  Anesthesia      Spontaneous Ventilation: absent    Sedation Level:  Deep  Preoxygenated: Yes    Final Airway Type:  Supraglottic airway  Final Supraglottic Airway:  Standard LMA    SGA Size:  4  Number of Attempts at Approach:  1

## 2025-04-16 NOTE — OR NURSING
1356- Pt to PACU 20 from OR. Bedside report from anesthesiologist and RN. Attached to monitoring, VSS, breathing is calm and unlabored on 6L mask with oral airway in place, airway dc'd. Dressing to scrotum CDI with dermabond in place.     1405- Pt given pain medicine per MAR, tolerating PO, weaned to 4L NC.     1420- Given subsequent dose of pain medicine per MAR.     1426- Spouse updated on pt status and plan of care.     1436- Spouse at bedside.     1444- Subsequent dose of pain meds given see MAR.    1500- Pt assisted up to the bathroom, able to void, dressed, assisted to recliner, given I.S.     1545- Pt feeling lightheaded, BP low, 96/51, given more IVF.     1553- Pt given toradol for pain per MAR    1620- Reviewed discharge instructions with pt and pt's wife, all instructions acknowledged, all questions answered. Prescriptions from pharmacy given to pt's wife. Pt reports feeling much better, VS improved, pain tolerable.     1633- IV dc'd, pt escorted off the unit in wheelchair by RN, all belongings sent with pt.

## 2025-04-16 NOTE — ANESTHESIA PREPROCEDURE EVALUATION
Case: [id number] Date/Time: 04/16/25 1245    Procedures:       RIGHT SPERMATOCELECTOMY, RIGHT HYDROCELECTOMY      HYDROCELECTOMY, ADULT    Pre-op diagnosis: BENIGN NEOPLASM OF SCROTUM    Location: [facility] ROOM 25 / SURGERY SAME DAY [facility]    Surgeons: Izaiah Tan M.D.            Relevant Problems   CARDIAC   (positive) Primary hypertension      GI   (positive) Gastroesophageal reflux disease without esophagitis      ENDO   (positive) Other specified hypothyroidism       Physical Exam    Airway   Mallampati: II  TM distance: >3 FB  Neck ROM: full       Cardiovascular - normal exam  Rhythm: regular  Rate: normal  (-) murmur     Dental - normal exam           Pulmonary - normal exam  Breath sounds clear to auscultation     Abdominal    Neurological - normal exam                   Anesthesia Plan    ASA 2       Plan - general       Airway plan will be LMA          Induction: intravenous    Postoperative Plan: Postoperative administration of opioids is intended.    Pertinent diagnostic labs and testing reviewed    Informed Consent:    Anesthetic plan and risks discussed with patient.    Use of blood products discussed with: patient whom consented to blood products.

## 2025-04-16 NOTE — OP REPORT
SURGEON: Dr. Izaiah Tan      ANESTHESIA: General (general endotracheal tube)      PRE-OPERATIVE DIAGNOSIS: Right hydrocele and spermatocele    POST-OPERATIVE DIAGNOSIS: Same      NAME OF PROCEDURE: 1. Right hydrocelectomy, 2. Right epididymetctomy, 3. Right orchiopexy    FINDINGS OF PROCEDURE: Epididymal abscess associated with the spermatocele    EBL: Minimal    COMPLICATIONS: None      PATIENT CONDITION: stable      INDICATIONS: Sachin Mandujano is a 59 y.o. male who agreed to above procedure after complete discussion of risks, benefits, and alternatives.     PROCEDURE: After informed consent was obtained in the preoperative care unit, the patient was taken to the OR on a stretcher. The patient was properly identified and placed in supine position per OR protocol. The patient was given a prophylactic dose of ancef 2 grams. General (general endotracheal tube) was administered. He was shaved, prepped and draped in a standard sterile fashion.  A timeout was performed with all parties in agreement.       A midline scrotal incision was made.  The Dartos was divided and the right hydrocele was delivered through the incision.  The hydrocele sac was opened and fluid was drained.  There was not excessive tissue as this was likely reactive to the spermatocele.  The edges of tunica were divided and hemostasis was obtained.  The epididymis was then mobilized off of the testicle.  The right epididymis was divided at the vas and suture ligated at this location.  The epdidymis and the spermatocele was removed en bloc.  During mobilization of the spermatocele, some of the contents were expressed.  This was sent for culture.  We then copiously irrigated the testicle.  The tunica was reapproximated over the defect after epididymis removal.  The testicle was then secured to the scrotum in a dependent position with 4-0 PDS suture in interrupted fashion, thus completing the orchiopexy.  The scrotum was then again copiously irrigated.   The Dartos was closed with 3-0 chromic suture.  The skin was reapproximated with 4-0 monocryl suture.  Dermabond was applied.  The patient tolerated the procedure well.  The patient was transferred to PACU in stable condition.      DISPOSITION: The patient will be discharged home with plan for follow-up in 4-6 weeks.

## 2025-04-19 LAB
BACTERIA WND AEROBE CULT: NORMAL
GRAM STN SPEC: NORMAL
SIGNIFICANT IND 70042: NORMAL
SITE SITE: NORMAL
SOURCE SOURCE: NORMAL

## 2025-04-20 LAB
BACTERIA SPEC ANAEROBE CULT: NORMAL
SIGNIFICANT IND 70042: NORMAL
SITE SITE: NORMAL
SOURCE SOURCE: NORMAL

## 2025-04-21 ENCOUNTER — RESULTS FOLLOW-UP (OUTPATIENT)
Dept: UROLOGY | Facility: MEDICAL CENTER | Age: 60
End: 2025-04-21

## (undated) DEVICE — SWAB CULTURE AMIES ESWAB (50EA/PK)

## (undated) DEVICE — GLOVE BIOGEL SZ 8 SURGICAL PF LTX - (50PR/BX 4BX/CA)

## (undated) DEVICE — NEEDLE NON SAFETY 25 GA X 1 1/2 IN HYPO (100EA/BX)

## (undated) DEVICE — SET LEADWIRE 5 LEAD BEDSIDE DISPOSABLE ECG (1SET OF 5/EA)

## (undated) DEVICE — SODIUM CHL IRRIGATION 0.9% 1000ML (12EA/CA)

## (undated) DEVICE — LOCAL

## (undated) DEVICE — HEAD HOLDER JUNIOR/ADULT

## (undated) DEVICE — SET EXTENSION WITH 2 PORTS (48EA/CA) ***PART #2C8610 IS A SUBSTITUTE*****

## (undated) DEVICE — MASK OXYGEN VNYL ADLT MED CONC WITH 7 FOOT TUBING - (50EA/CA)

## (undated) DEVICE — LACTATED RINGERS INJ 1000 ML - (14EA/CA 60CA/PF)

## (undated) DEVICE — PACK LOWER EXTREMITY - (2/CA)

## (undated) DEVICE — ELECTRODE DUAL RETURN W/ CORD - (50/PK)

## (undated) DEVICE — GLOVE BIOGEL SZ 6 PF LATEX - (50EA/BX 4BX/CA)

## (undated) DEVICE — SLEEVE VASO DVT COMPRESSION CALF MED - (10PR/CA)

## (undated) DEVICE — CANISTER SUCTION 3000ML MECHANICAL FILTER AUTO SHUTOFF MEDI-VAC NONSTERILE LF DISP (40EA/CA)

## (undated) DEVICE — SUTURE 3-0 ETHILON FS-1 - (36/BX) 30 INCH

## (undated) DEVICE — ELECTRODE 850 FOAM ADHESIVE - HYDROGEL RADIOTRNSPRNT (50/PK)

## (undated) DEVICE — GLOVE BIOGEL SZ 7.5 SURGICAL PF LTX - (50PR/BX 4BX/CA)

## (undated) DEVICE — MASK ANESTHESIA ADULT  - (100/CA)

## (undated) DEVICE — GLOVE BIOGEL INDICATOR SZ 6.5 SURGICAL PF LTX - (50PR/BX 4BX/CA)

## (undated) DEVICE — PAD PREP 24 X 48 CUFFED - (100/CA)

## (undated) DEVICE — PADDING CAST 4 IN X 4 YDS - SOF-ROLL (12RL/BG 6BG/CT)

## (undated) DEVICE — SUCTION INSTRUMENT YANKAUER BULBOUS TIP W/O VENT (50EA/CA)

## (undated) DEVICE — SUTURE 4-0 MONOCRYL PLUS PS-1 - 27 INCH (36/BX)

## (undated) DEVICE — GLOVE BIOGEL SZ 6.5 SURGICAL PF LTX (50PR/BX 4BX/CA)

## (undated) DEVICE — CHLORAPREP 26 ML APPLICATOR - ORANGE TINT(25/CA)

## (undated) DEVICE — GOWN WARMING STANDARD FLEX - (30/CA)

## (undated) DEVICE — DETERGENT RENUZYME PLUS 10 OZ PACKET (50/BX)

## (undated) DEVICE — STOCKINET BIAS 4 IN STERILE - (20/CA)

## (undated) DEVICE — SUTURE 4-0 ETHILON FS-2 18 (36PK/BX)"

## (undated) DEVICE — SENSOR SPO2 NEO LNCS ADHESIVE (20/BX) SEE USER NOTES

## (undated) DEVICE — TUBING CLEARLINK DUO-VENT - C-FLO (48EA/CA)

## (undated) DEVICE — CANNULA O2 COMFORT SOFT EAR ADULT 7 FT TUBING (50/CA)

## (undated) DEVICE — GLOVE BIOGEL INDICATOR SZ 8 SURGICAL PF LTX - (50/BX 4BX/CA)

## (undated) DEVICE — SUTURE 3-0 CHROMIC GUT SH 27 (36PK/BX)"

## (undated) DEVICE — NEPTUNE 4 PORT MANIFOLD - (20/PK)

## (undated) DEVICE — SPLINT PLASTER 4 IN  X 15 IN - (50/BX 12BX/CA)

## (undated) DEVICE — SPONGE GAUZESTER 4 X 4 4PLY - (128PK/CA)

## (undated) DEVICE — STAPLER SKIN DISP - (6/BX 10BX/CA) VISISTAT

## (undated) DEVICE — MASK AIRWAY SIZE 4 UNIQUE SILICON (10EA/BX)

## (undated) DEVICE — SUTURE 4-0 CHROMIC RB-1 27 (36PK/BX)"

## (undated) DEVICE — CANISTER SUCTION 3000ML MECHANICAL FILTER AUTO SHUTOFF MEDI-VAC NONSTERILE LF DISP  (40EA/CA)

## (undated) DEVICE — KIT ROOM DECONTAMINATION

## (undated) DEVICE — DRAPE LAPAROTOMY T SHEET - (12EA/CA)

## (undated) DEVICE — SUTURE 4-0 PDS II RB-1 (36EA/BX)

## (undated) DEVICE — SUTURE GENERAL

## (undated) DEVICE — KIT  I.V. START (100EA/CA)

## (undated) DEVICE — SYRINGE STERILE 10 ML LL (200/BX)

## (undated) DEVICE — PACK MINOR ROSEVIEW - (7EA/CA)

## (undated) DEVICE — SHEET THYROID - (10EA/CA)

## (undated) DEVICE — BANDAGE STERILE 2 IN X 75 IN (12EA/BX 8BX/CA)

## (undated) DEVICE — KIT ANESTHESIA W/CIRCUIT & 3/LT BAG W/FILTER (20EA/CA)

## (undated) DEVICE — GLOVE BIOGEL ECLIPSE PF LATEX SIZE 7.5

## (undated) DEVICE — DERMABOND ADVANCED - (12EA/BX)

## (undated) DEVICE — GLOVE SZ 6.5 BIOGEL PI MICRO - PF LF (50PR/BX)

## (undated) DEVICE — TOWEL STOP TIMEOUT SAFETY FLAG (40EA/CA)

## (undated) DEVICE — SLEEVE, VASO, THIGH, MED

## (undated) DEVICE — SUTURE 0 SILK TIES (36PK/BX)

## (undated) DEVICE — TUBE CONNECTING SUCTION - CLEAR PLASTIC STERILE 72 IN (50EA/CA)

## (undated) DEVICE — SENSOR OXIMETER ADULT SPO2 RD SET (20EA/BX)

## (undated) DEVICE — SUTURE 3-0 MONOCRYL SH (36PK/BX)

## (undated) DEVICE — GLOVE BIOGEL SZ 7 SURGICAL PF LTX - (50PR/BX 4BX/CA)

## (undated) DEVICE — PROTECTOR ULNA NERVE - (36PR/CA)

## (undated) DEVICE — GLOVE BIOGEL PI INDICATOR SZ 7.0 SURGICAL PF LF - (50/BX 4BX/CA)

## (undated) DEVICE — GLOVE BIOGEL PI INDICATOR SZ 6.0 SURGICAL PF LF -(200PR/CA)

## (undated) DEVICE — DRAPE STRLE REG TOWEL 18X24 - (10/BX 4BX/CA)"

## (undated) DEVICE — CANISTER SUCTION RIGID RED 1500CC (40EA/CA)